# Patient Record
Sex: FEMALE | Race: WHITE | HISPANIC OR LATINO | Employment: FULL TIME
[De-identification: names, ages, dates, MRNs, and addresses within clinical notes are randomized per-mention and may not be internally consistent; named-entity substitution may affect disease eponyms.]

---

## 2017-01-01 ENCOUNTER — HOSPITAL (OUTPATIENT)
Dept: OTHER | Age: 32
End: 2017-01-01
Attending: ORTHOPAEDIC SURGERY

## 2017-01-13 ENCOUNTER — CHARTING TRANS (OUTPATIENT)
Dept: OTHER | Age: 32
End: 2017-01-13

## 2017-01-13 ENCOUNTER — LAB SERVICES (OUTPATIENT)
Dept: OTHER | Age: 32
End: 2017-01-13

## 2017-01-13 ASSESSMENT — PAIN SCALES - GENERAL: PAINLEVEL_OUTOF10: 0

## 2017-01-14 ENCOUNTER — CHARTING TRANS (OUTPATIENT)
Dept: OTHER | Age: 32
End: 2017-01-14

## 2017-01-14 LAB
ALBUMIN SERPL-MCNC: 3.7 G/DL (ref 3.6–5.1)
ALBUMIN/GLOB SERPL: 0.9 (ref 1–2.4)
ALP SERPL-CCNC: 108 UNITS/L (ref 45–117)
ALT SERPL-CCNC: 33 UNITS/L
ANION GAP SERPL CALC-SCNC: 12 MMOL/L (ref 10–20)
AST SERPL-CCNC: 22 UNITS/L
BASOPHILS # BLD: 0 K/MCL (ref 0–0.3)
BASOPHILS NFR BLD: 0 %
BILIRUB SERPL-MCNC: 0.3 MG/DL (ref 0.2–1)
BUN SERPL-MCNC: 10 MG/DL (ref 10–20)
BUN/CREAT SERPL: 18 (ref 7–25)
CALCIUM SERPL-MCNC: 8.7 MG/DL (ref 8.4–10.2)
CHLORIDE SERPL-SCNC: 105 MMOL/L (ref 98–107)
CO2 SERPL-SCNC: 27 MMOL/L (ref 21–32)
CREAT SERPL-MCNC: 0.57 MG/DL (ref 0.51–0.95)
DIFFERENTIAL METHOD BLD: NORMAL
EOSINOPHIL # BLD: 0.1 K/MCL (ref 0.1–0.5)
EOSINOPHIL NFR BLD: 1 %
ERYTHROCYTE [DISTWIDTH] IN BLOOD: 12.7 % (ref 11–15)
GLOBULIN SER-MCNC: 4.2 G/DL (ref 2–4)
GLUCOSE SERPL-MCNC: 95 MG/DL (ref 65–99)
HEMATOCRIT: 40.1 % (ref 36–46.5)
HEMOGLOBIN: 13.6 G/DL (ref 12–15.5)
LENGTH OF FAST TIME PATIENT: ABNORMAL HRS
LYMPHOCYTES # BLD: 3 K/MCL (ref 1–4.8)
LYMPHOCYTES NFR BLD: 30 %
MAGNESIUM SERPL-MCNC: 1.9 MG/DL (ref 1.7–2.4)
MEAN CORPUSCULAR HEMOGLOBIN: 29.4 PG (ref 26–34)
MEAN CORPUSCULAR HGB CONC: 33.9 G/DL (ref 32–36.5)
MEAN CORPUSCULAR VOLUME: 86.6 FL (ref 78–100)
MONOCYTES # BLD: 0.7 K/MCL (ref 0.3–0.9)
MONOCYTES NFR BLD: 7 %
NEUTROPHILS # BLD: 6.5 K/MCL (ref 1.8–7.7)
NEUTROPHILS NFR BLD: 62 %
PLATELET COUNT: 308 K/MCL (ref 140–450)
POTASSIUM SERPL-SCNC: 3.7 MMOL/L (ref 3.4–5.1)
RED CELL COUNT: 4.63 MIL/MCL (ref 4–5.2)
SODIUM SERPL-SCNC: 140 MMOL/L (ref 135–145)
TOTAL PROTEIN: 7.9 G/DL (ref 6.4–8.2)
TSH SERPL-ACNC: 1.7 MCUNITS/ML (ref 0.35–5)
WHITE BLOOD COUNT: 10.3 K/MCL (ref 4.2–11)

## 2017-02-01 ENCOUNTER — HOSPITAL (OUTPATIENT)
Dept: OTHER | Age: 32
End: 2017-02-01
Attending: ORTHOPAEDIC SURGERY

## 2017-04-27 ENCOUNTER — CHARTING TRANS (OUTPATIENT)
Dept: OTHER | Age: 32
End: 2017-04-27

## 2017-04-27 ENCOUNTER — LAB SERVICES (OUTPATIENT)
Dept: OTHER | Age: 32
End: 2017-04-27

## 2017-04-27 LAB
APPEARANCE: CLEAR
BILIRUBIN: NEGATIVE
COLOR: YELLOW
GLUCOSE U: NEGATIVE
KETONES: NEGATIVE
LEUKOCYTES: NEGATIVE
NITRITE: NEGATIVE
OCCULT BLOOD: NORMAL
PH: 5.5
PROTEIN: NEGATIVE
RAPID STREP GROUP A: POSITIVE
URINE SPEC GRAVITY: 1.01
UROBILINOGEN: NORMAL

## 2017-04-27 ASSESSMENT — PAIN SCALES - GENERAL: PAINLEVEL_OUTOF10: 5

## 2017-05-01 ENCOUNTER — CHARTING TRANS (OUTPATIENT)
Dept: OTHER | Age: 32
End: 2017-05-01

## 2017-05-01 ENCOUNTER — LAB SERVICES (OUTPATIENT)
Dept: OTHER | Age: 32
End: 2017-05-01

## 2017-05-01 LAB
APPEARANCE: CLEAR
BACTERIA UR CULT: NORMAL
BILIRUBIN: NORMAL
COLOR: YELLOW
GLUCOSE U: NORMAL
KETONES: NORMAL
LEUKOCYTE ESTERASE: NORMAL
LEUKOCYTES: NORMAL
NITRITE: NORMAL
OCCULT BLOOD: NORMAL
PH: NORMAL
PROTEIN: NORMAL
URINE SPEC GRAVITY: 1.02
UROBILINOGEN: NORMAL

## 2017-05-19 ENCOUNTER — IMAGING SERVICES (OUTPATIENT)
Dept: OTHER | Age: 32
End: 2017-05-19

## 2017-05-19 ENCOUNTER — HOSPITAL (OUTPATIENT)
Dept: OTHER | Age: 32
End: 2017-05-19

## 2017-08-09 ENCOUNTER — HOSPITAL (OUTPATIENT)
Dept: OTHER | Age: 32
End: 2017-08-09

## 2017-08-09 ENCOUNTER — LAB SERVICES (OUTPATIENT)
Dept: OTHER | Age: 32
End: 2017-08-09

## 2017-08-09 LAB
ALBUMIN SERPL-MCNC: 3.5 G/DL (ref 3.6–5.1)
ALBUMIN SERPL-MCNC: 3.5 GM/DL (ref 3.6–5.1)
ALBUMIN/GLOB SERPL: 0.7 (ref 1–2.4)
ALBUMIN/GLOB SERPL: 0.7 {RATIO} (ref 1–2.4)
ALP SERPL-CCNC: 98 UNIT/L (ref 45–117)
ALP SERPL-CCNC: 98 UNITS/L (ref 45–117)
ALT SERPL-CCNC: 31 UNIT/L
ALT SERPL-CCNC: 31 UNITS/L
ANALYZER ANC (IANC): ABNORMAL
ANALYZER ANC (IANC): ABNORMAL
ANION GAP SERPL CALC-SCNC: 16 MMOL/L (ref 10–20)
ANION GAP SERPL CALC-SCNC: 16 MMOL/L (ref 10–20)
AST SERPL-CCNC: 30 UNIT/L
AST SERPL-CCNC: 30 UNITS/L
BASOPHILS # BLD: 0 K/MCL (ref 0–0.3)
BASOPHILS # BLD: 0 THOUSAND/MCL (ref 0–0.3)
BASOPHILS NFR BLD: 0 %
BASOPHILS NFR BLD: 0 %
BILIRUB SERPL-MCNC: 0.4 MG/DL (ref 0.2–1)
BILIRUB SERPL-MCNC: 0.4 MG/DL (ref 0.2–1)
BUN SERPL-MCNC: 11 MG/DL (ref 6–20)
BUN SERPL-MCNC: 11 MG/DL (ref 6–20)
BUN/CREAT SERPL: 16 (ref 7–25)
BUN/CREAT SERPL: 16 (ref 7–25)
CALCIUM SERPL-MCNC: 8.8 MG/DL (ref 8.4–10.2)
CALCIUM SERPL-MCNC: 8.8 MG/DL (ref 8.4–10.2)
CHLORIDE SERPL-SCNC: 100 MMOL/L (ref 98–107)
CHLORIDE: 100 MMOL/L (ref 98–107)
CO2 SERPL-SCNC: 23 MMOL/L (ref 21–32)
CO2 SERPL-SCNC: 23 MMOL/L (ref 21–32)
CREAT SERPL-MCNC: 0.69 MG/DL (ref 0.51–0.95)
CREAT SERPL-MCNC: 0.69 MG/DL (ref 0.51–0.95)
DIFFERENTIAL METHOD BLD: ABNORMAL
DIFFERENTIAL METHOD BLD: ABNORMAL
EOSINOPHIL # BLD: 0.1 K/MCL (ref 0.1–0.5)
EOSINOPHIL # BLD: 0.1 THOUSAND/MCL (ref 0.1–0.5)
EOSINOPHIL NFR BLD: 1 %
EOSINOPHIL NFR BLD: 1 %
ERYTHROCYTE [DISTWIDTH] IN BLOOD: 12.6 % (ref 11–15)
ERYTHROCYTE [DISTWIDTH] IN BLOOD: 12.6 % (ref 11–15)
GLOBULIN SER-MCNC: 5.3 G/DL (ref 2–4)
GLOBULIN SER-MCNC: 5.3 GM/DL (ref 2–4)
GLUCOSE SERPL-MCNC: 119 MG/DL (ref 65–99)
GLUCOSE SERPL-MCNC: 119 MG/DL (ref 65–99)
HEMATOCRIT: 45.7 % (ref 36–46.5)
HEMATOCRIT: 45.7 % (ref 36–46.5)
HEMOGLOBIN: 16.2 G/DL (ref 12–15.5)
HGB BLD-MCNC: 16.2 GM/DL (ref 12–15.5)
LIPASE SERPL-CCNC: 117 UNIT/L (ref 73–393)
LIPASE SERPL-CCNC: 117 UNITS/L (ref 73–393)
LYMPHOCYTES # BLD: 2.5 K/MCL (ref 1–4.8)
LYMPHOCYTES # BLD: 2.5 THOUSAND/MCL (ref 1–4.8)
LYMPHOCYTES NFR BLD: 26 %
LYMPHOCYTES NFR BLD: 26 %
MAGNESIUM SERPL-MCNC: 1.7 MG/DL (ref 1.7–2.4)
MCH RBC QN AUTO: 29.5 PG (ref 26–34)
MCHC RBC AUTO-ENTMCNC: 35.4 GM/DL (ref 32–36.5)
MCV RBC AUTO: 83.1 FL (ref 78–100)
MEAN CORPUSCULAR HEMOGLOBIN: 29.5 PG (ref 26–34)
MEAN CORPUSCULAR HGB CONC: 35.4 G/DL (ref 32–36.5)
MEAN CORPUSCULAR VOLUME: 83.1 FL (ref 78–100)
MONOCYTES # BLD: 0.9 K/MCL (ref 0.3–0.9)
MONOCYTES # BLD: 0.9 THOUSAND/MCL (ref 0.3–0.9)
MONOCYTES NFR BLD: 10 %
MONOCYTES NFR BLD: 10 %
NEUTROPHILS # BLD: 6 K/MCL (ref 1.8–7.7)
NEUTROPHILS # BLD: 6 THOUSAND/MCL (ref 1.8–7.7)
NEUTROPHILS NFR BLD: 63 %
NEUTROPHILS NFR BLD: 63 %
NEUTS SEG NFR BLD: ABNORMAL
NEUTS SEG NFR BLD: ABNORMAL %
NRBC (NRBCRE): ABNORMAL
PERCENT NRBC: ABNORMAL
PLATELET # BLD: 272 THOUSAND/MCL (ref 140–450)
PLATELET COUNT: 272 K/MCL (ref 140–450)
POTASSIUM SERPL-SCNC: 3.1 MMOL/L (ref 3.4–5.1)
POTASSIUM SERPL-SCNC: 3.1 MMOL/L (ref 3.4–5.1)
PROT SERPL-MCNC: 8.8 GM/DL (ref 6.4–8.2)
RBC # BLD: 5.5 MILLION/MCL (ref 4–5.2)
RED CELL COUNT: 5.5 MIL/MCL (ref 4–5.2)
SODIUM SERPL-SCNC: 136 MMOL/L (ref 135–145)
SODIUM SERPL-SCNC: 136 MMOL/L (ref 135–145)
TOTAL PROTEIN: 8.8 G/DL (ref 6.4–8.2)
WBC # BLD: 9.5 THOUSAND/MCL (ref 4.2–11)
WHITE BLOOD COUNT: 9.5 K/MCL (ref 4.2–11)

## 2017-08-10 ENCOUNTER — LAB SERVICES (OUTPATIENT)
Dept: OTHER | Age: 32
End: 2017-08-10

## 2017-08-10 LAB
AMORPH SED URNS QL MICRO: ABNORMAL
AMORPH SED URNS QL MICRO: ABNORMAL
APPEARANCE UR: ABNORMAL
APPEARANCE UR: ABNORMAL
BACTERIA #/AREA URNS HPF: ABNORMAL /HPF
BACTERIA #/AREA URNS HPF: ABNORMAL /HPF
BILIRUB UR QL: NEGATIVE
BILIRUB UR QL: NEGATIVE
CAOX CRY URNS QL MICRO: ABNORMAL
CAOX CRY URNS QL MICRO: ABNORMAL
COLOR UR: ABNORMAL
COLOR UR: ABNORMAL
EPITH CASTS #/AREA URNS LPF: ABNORMAL
EPITH CASTS #/AREA URNS LPF: ABNORMAL /[LPF]
FATTY CASTS #/AREA URNS LPF: ABNORMAL
FATTY CASTS #/AREA URNS LPF: ABNORMAL /[LPF]
GLUCOSE UR-MCNC: NEGATIVE MG/DL
GLUCOSE UR-MCNC: NEGATIVE MG/DL
GRAN CASTS #/AREA URNS LPF: ABNORMAL
GRAN CASTS #/AREA URNS LPF: ABNORMAL /[LPF]
HCG POINT OF CARE (5HGRST): NEGATIVE
HCG POINT OF CARE (5HGRST): NEGATIVE
HG POINT OF CARE QC: NORMAL
HG POINT OF CARE QC: NORMAL
HGB UR QL: NEGATIVE
HYALINE CASTS #/AREA URNS LPF: ABNORMAL
HYALINE CASTS #/AREA URNS LPF: ABNORMAL /[LPF]
KETONES UR-MCNC: 20 MG/DL
KETONES UR-MCNC: 20 MG/DL
LEUKOCYTE ESTERASE UR QL STRIP: NEGATIVE
MAGNESIUM SERPL-MCNC: 1.7 MG/DL (ref 1.7–2.4)
MICROSCOPIC (MT): ABNORMAL
MICROSCOPIC (MT): ABNORMAL
MIXED CELL CASTS #/AREA URNS LPF: ABNORMAL
MIXED CELL CASTS #/AREA URNS LPF: ABNORMAL /[LPF]
MUCOUS THREADS URNS QL MICRO: PRESENT
MUCOUS THREADS URNS QL MICRO: PRESENT
NITRITE UR QL: NEGATIVE
NITRITE UR QL: NEGATIVE
PH UR: 5 UNIT (ref 5–7)
PH UR: 5 UNITS (ref 5–7)
PROT UR QL: 100 MG/DL
PROT UR QL: 100 MG/DL
RBC #/AREA URNS HPF: ABNORMAL /HPF (ref 0–3)
RBC #/AREA URNS HPF: ABNORMAL /HPF (ref 0–3)
RBC CASTS #/AREA URNS LPF: ABNORMAL
RBC CASTS #/AREA URNS LPF: ABNORMAL /[LPF]
RBC-URINE: NEGATIVE
RENAL EPI CELLS #/AREA URNS HPF: ABNORMAL
RENAL EPI CELLS #/AREA URNS HPF: ABNORMAL /[HPF]
SP GR UR: 1.03 (ref 1–1.03)
SP GR UR: 1.03 (ref 1–1.03)
SPECIMEN SOURCE: ABNORMAL
SPECIMEN SOURCE: ABNORMAL
SPERM URNS QL MICRO: ABNORMAL
SPERM URNS QL MICRO: ABNORMAL
SQUAMOUS #/AREA URNS HPF: ABNORMAL /HPF (ref 0–5)
SQUAMOUS #/AREA URNS HPF: ABNORMAL /HPF (ref 0–5)
T VAGINALIS URNS QL MICRO: ABNORMAL
T VAGINALIS URNS QL MICRO: ABNORMAL
TRI-PHOS CRY URNS QL MICRO: ABNORMAL
TRI-PHOS CRY URNS QL MICRO: ABNORMAL
URATE CRY URNS QL MICRO: ABNORMAL
URATE CRY URNS QL MICRO: ABNORMAL
URNS CMNT MICRO: ABNORMAL
URNS CMNT MICRO: ABNORMAL
UROBILINOGEN UR QL: 0.2 MG/DL (ref 0–1)
UROBILINOGEN UR QL: 0.2 MG/DL (ref 0–1)
WAXY CASTS #/AREA URNS LPF: ABNORMAL
WAXY CASTS #/AREA URNS LPF: ABNORMAL /[LPF]
WBC #/AREA URNS HPF: ABNORMAL /HPF (ref 0–5)
WBC #/AREA URNS HPF: ABNORMAL /HPF (ref 0–5)
WBC CASTS #/AREA URNS LPF: ABNORMAL
WBC CASTS #/AREA URNS LPF: ABNORMAL /[LPF]
WBC-URINE: NEGATIVE
YEAST HYPHAE URNS QL MICRO: ABNORMAL
YEAST HYPHAE URNS QL MICRO: ABNORMAL
YEAST URNS QL MICRO: ABNORMAL
YEAST URNS QL MICRO: ABNORMAL

## 2017-08-13 ENCOUNTER — LAB SERVICES (OUTPATIENT)
Dept: OTHER | Age: 32
End: 2017-08-13

## 2017-08-13 ENCOUNTER — HOSPITAL (OUTPATIENT)
Dept: OTHER | Age: 32
End: 2017-08-13

## 2017-08-13 LAB
ALBUMIN SERPL-MCNC: 3.3 G/DL (ref 3.6–5.1)
ALBUMIN SERPL-MCNC: 3.3 GM/DL (ref 3.6–5.1)
ALBUMIN/GLOB SERPL: 0.7 (ref 1–2.4)
ALBUMIN/GLOB SERPL: 0.7 {RATIO} (ref 1–2.4)
ALP SERPL-CCNC: 88 UNIT/L (ref 45–117)
ALP SERPL-CCNC: 88 UNITS/L (ref 45–117)
ALT SERPL-CCNC: 23 UNIT/L
ALT SERPL-CCNC: 23 UNITS/L
ANALYZER ANC (IANC): ABNORMAL
ANALYZER ANC (IANC): ABNORMAL
ANION GAP SERPL CALC-SCNC: 17 MMOL/L (ref 10–20)
ANION GAP SERPL CALC-SCNC: 17 MMOL/L (ref 10–20)
AST SERPL-CCNC: 26 UNIT/L
AST SERPL-CCNC: 26 UNITS/L
BASOPHILS # BLD: 0.1 K/MCL (ref 0–0.3)
BASOPHILS # BLD: 0.1 THOUSAND/MCL (ref 0–0.3)
BASOPHILS NFR BLD: 1 %
BASOPHILS NFR BLD: 1 %
BILIRUB SERPL-MCNC: 0.3 MG/DL (ref 0.2–1)
BILIRUB SERPL-MCNC: 0.3 MG/DL (ref 0.2–1)
BUN SERPL-MCNC: 13 MG/DL (ref 6–20)
BUN SERPL-MCNC: 13 MG/DL (ref 6–20)
BUN/CREAT SERPL: 22 (ref 7–25)
BUN/CREAT SERPL: 22 (ref 7–25)
CALCIUM SERPL-MCNC: 8.6 MG/DL (ref 8.4–10.2)
CALCIUM SERPL-MCNC: 8.6 MG/DL (ref 8.4–10.2)
CHLORIDE SERPL-SCNC: 101 MMOL/L (ref 98–107)
CHLORIDE: 101 MMOL/L (ref 98–107)
CO2 SERPL-SCNC: 25 MMOL/L (ref 21–32)
CO2 SERPL-SCNC: 25 MMOL/L (ref 21–32)
CREAT SERPL-MCNC: 0.58 MG/DL (ref 0.51–0.95)
CREAT SERPL-MCNC: 0.58 MG/DL (ref 0.51–0.95)
DIFFERENTIAL METHOD BLD: ABNORMAL
DIFFERENTIAL METHOD BLD: ABNORMAL
EOSINOPHIL # BLD: 0.2 K/MCL (ref 0.1–0.5)
EOSINOPHIL # BLD: 0.2 THOUSAND/MCL (ref 0.1–0.5)
EOSINOPHIL NFR BLD: 2 %
EOSINOPHIL NFR BLD: 2 %
ERYTHROCYTE [DISTWIDTH] IN BLOOD: 12.3 % (ref 11–15)
ERYTHROCYTE [DISTWIDTH] IN BLOOD: 12.3 % (ref 11–15)
GLOBULIN SER-MCNC: 5 G/DL (ref 2–4)
GLOBULIN SER-MCNC: 5 GM/DL (ref 2–4)
GLUCOSE SERPL-MCNC: 97 MG/DL (ref 65–99)
GLUCOSE SERPL-MCNC: 97 MG/DL (ref 65–99)
HEMATOCRIT: 42.8 % (ref 36–46.5)
HEMATOCRIT: 42.8 % (ref 36–46.5)
HEMOGLOBIN: 15.1 G/DL (ref 12–15.5)
HGB BLD-MCNC: 15.1 GM/DL (ref 12–15.5)
LYMPHOCYTES # BLD: 3.4 K/MCL (ref 1–4.8)
LYMPHOCYTES # BLD: 3.4 THOUSAND/MCL (ref 1–4.8)
LYMPHOCYTES NFR BLD: 36 %
LYMPHOCYTES NFR BLD: 36 %
MCH RBC QN AUTO: 28.7 PG (ref 26–34)
MCHC RBC AUTO-ENTMCNC: 35.3 GM/DL (ref 32–36.5)
MCV RBC AUTO: 81.4 FL (ref 78–100)
MEAN CORPUSCULAR HEMOGLOBIN: 28.7 PG (ref 26–34)
MEAN CORPUSCULAR HGB CONC: 35.3 G/DL (ref 32–36.5)
MEAN CORPUSCULAR VOLUME: 81.4 FL (ref 78–100)
MONOCYTES # BLD: 1.2 K/MCL (ref 0.3–0.9)
MONOCYTES # BLD: 1.2 THOUSAND/MCL (ref 0.3–0.9)
MONOCYTES NFR BLD: 13 %
MONOCYTES NFR BLD: 13 %
NEUTROPHILS # BLD: 4.5 K/MCL (ref 1.8–7.7)
NEUTROPHILS # BLD: 4.5 THOUSAND/MCL (ref 1.8–7.7)
NEUTROPHILS NFR BLD: 48 %
NEUTROPHILS NFR BLD: 48 %
NEUTS SEG NFR BLD: ABNORMAL
NEUTS SEG NFR BLD: ABNORMAL %
NRBC (NRBCRE): ABNORMAL
PERCENT NRBC: ABNORMAL
PLATELET # BLD: 285 THOUSAND/MCL (ref 140–450)
PLATELET COUNT: 285 K/MCL (ref 140–450)
POTASSIUM SERPL-SCNC: 3 MMOL/L (ref 3.4–5.1)
POTASSIUM SERPL-SCNC: 3 MMOL/L (ref 3.4–5.1)
PROT SERPL-MCNC: 8.3 GM/DL (ref 6.4–8.2)
RBC # BLD: 5.26 MILLION/MCL (ref 4–5.2)
RED CELL COUNT: 5.26 MIL/MCL (ref 4–5.2)
SODIUM SERPL-SCNC: 140 MMOL/L (ref 135–145)
SODIUM SERPL-SCNC: 140 MMOL/L (ref 135–145)
TOTAL PROTEIN: 8.3 G/DL (ref 6.4–8.2)
WBC # BLD: 9.2 THOUSAND/MCL (ref 4.2–11)
WHITE BLOOD COUNT: 9.2 K/MCL (ref 4.2–11)

## 2017-08-14 ENCOUNTER — LAB SERVICES (OUTPATIENT)
Dept: OTHER | Age: 32
End: 2017-08-14

## 2017-08-14 LAB
APPEARANCE UR: ABNORMAL
APPEARANCE UR: ABNORMAL
BACTERIA #/AREA URNS HPF: ABNORMAL /HPF
BACTERIA #/AREA URNS HPF: ABNORMAL /HPF
BILIRUB UR QL: NEGATIVE
BILIRUB UR QL: NEGATIVE
COLOR UR: YELLOW
COLOR UR: YELLOW
GLUCOSE UR-MCNC: NEGATIVE MG/DL
GLUCOSE UR-MCNC: NEGATIVE MG/DL
HCG POINT OF CARE (5HGRST): NEGATIVE
HCG POINT OF CARE (5HGRST): NEGATIVE
HG POINT OF CARE QC: NORMAL
HG POINT OF CARE QC: NORMAL
HGB UR QL: NEGATIVE
HYALINE CASTS #/AREA URNS LPF: ABNORMAL /LPF (ref 0–5)
HYALINE CASTS #/AREA URNS LPF: ABNORMAL /LPF (ref 0–5)
KETONES UR-MCNC: >80 MG/DL
KETONES UR-MCNC: >80 MG/DL
LEUKOCYTE ESTERASE UR QL STRIP: NEGATIVE
MUCOUS THREADS URNS QL MICRO: PRESENT
MUCOUS THREADS URNS QL MICRO: PRESENT
NITRITE UR QL: NEGATIVE
NITRITE UR QL: NEGATIVE
PH UR: 6.5 UNIT (ref 5–7)
PH UR: 6.5 UNITS (ref 5–7)
PROT UR QL: 100 MG/DL
PROT UR QL: 100 MG/DL
RBC #/AREA URNS HPF: ABNORMAL /HPF (ref 0–3)
RBC #/AREA URNS HPF: ABNORMAL /HPF (ref 0–3)
RBC-URINE: NEGATIVE
SP GR UR: 1.02 (ref 1–1.03)
SP GR UR: 1.02 (ref 1–1.03)
SPECIMEN SOURCE: ABNORMAL
SPECIMEN SOURCE: ABNORMAL
SQUAMOUS #/AREA URNS HPF: ABNORMAL /HPF (ref 0–5)
SQUAMOUS #/AREA URNS HPF: ABNORMAL /HPF (ref 0–5)
UROBILINOGEN UR QL: 0.2 MG/DL (ref 0–1)
UROBILINOGEN UR QL: 0.2 MG/DL (ref 0–1)
WBC #/AREA URNS HPF: ABNORMAL /HPF (ref 0–5)
WBC #/AREA URNS HPF: ABNORMAL /HPF (ref 0–5)
WBC-URINE: NEGATIVE

## 2017-08-17 ENCOUNTER — CHARTING TRANS (OUTPATIENT)
Dept: OTHER | Age: 32
End: 2017-08-17

## 2018-11-03 VITALS
OXYGEN SATURATION: 98 % | TEMPERATURE: 96.6 F | WEIGHT: 167.55 LBS | HEART RATE: 91 BPM | SYSTOLIC BLOOD PRESSURE: 86 MMHG | DIASTOLIC BLOOD PRESSURE: 50 MMHG | HEIGHT: 62 IN | BODY MASS INDEX: 30.83 KG/M2 | RESPIRATION RATE: 18 BRPM

## 2018-11-03 VITALS
RESPIRATION RATE: 20 BRPM | DIASTOLIC BLOOD PRESSURE: 78 MMHG | HEART RATE: 119 BPM | TEMPERATURE: 100.5 F | SYSTOLIC BLOOD PRESSURE: 119 MMHG | OXYGEN SATURATION: 99 %

## 2018-11-03 VITALS
HEIGHT: 62 IN | DIASTOLIC BLOOD PRESSURE: 72 MMHG | OXYGEN SATURATION: 100 % | HEART RATE: 86 BPM | RESPIRATION RATE: 18 BRPM | BODY MASS INDEX: 32.2 KG/M2 | TEMPERATURE: 97.9 F | SYSTOLIC BLOOD PRESSURE: 110 MMHG | WEIGHT: 175 LBS

## 2018-11-05 VITALS
DIASTOLIC BLOOD PRESSURE: 60 MMHG | TEMPERATURE: 97.3 F | WEIGHT: 172.5 LBS | HEART RATE: 73 BPM | RESPIRATION RATE: 16 BRPM | HEIGHT: 62 IN | SYSTOLIC BLOOD PRESSURE: 102 MMHG | OXYGEN SATURATION: 99 % | BODY MASS INDEX: 31.74 KG/M2

## 2018-11-15 ENCOUNTER — LAB SERVICES (OUTPATIENT)
Dept: OTHER | Age: 33
End: 2018-11-15

## 2018-11-15 ENCOUNTER — CHARTING TRANS (OUTPATIENT)
Dept: OTHER | Age: 33
End: 2018-11-15

## 2018-11-15 LAB
ALBUMIN SERPL-MCNC: 3.6 G/DL (ref 3.6–5.1)
ALBUMIN/GLOB SERPL: 0.9 {RATIO} (ref 1–2.4)
ALP SERPL-CCNC: 99 UNITS/L (ref 45–117)
ALT SERPL-CCNC: 21 UNITS/L
ANION GAP SERPL CALC-SCNC: 12 MMOL/L (ref 10–20)
AST SERPL-CCNC: 20 UNITS/L
BASOPHILS # BLD: 0 K/MCL (ref 0–0.3)
BASOPHILS NFR BLD: 0 %
BILIRUB SERPL-MCNC: 0.5 MG/DL (ref 0.2–1)
BUN SERPL-MCNC: 10 MG/DL (ref 6–20)
BUN/CREAT SERPL: 17 (ref 7–25)
CALCIUM SERPL-MCNC: 8.5 MG/DL (ref 8.4–10.2)
CHLORIDE SERPL-SCNC: 108 MMOL/L (ref 98–107)
CHOLEST SERPL-MCNC: 187 MG/DL
CHOLEST/HDLC SERPL: 3.8 {RATIO}
CO2 SERPL-SCNC: 25 MMOL/L (ref 21–32)
CREAT SERPL-MCNC: 0.59 MG/DL (ref 0.51–0.95)
DIFFERENTIAL METHOD BLD: NORMAL
EOSINOPHIL # BLD: 0.1 K/MCL (ref 0.1–0.5)
EOSINOPHIL NFR BLD: 1 %
ERYTHROCYTE [DISTWIDTH] IN BLOOD: 12.5 % (ref 11–15)
GLOBULIN SER-MCNC: 4.1 G/DL (ref 2–4)
GLUCOSE SERPL-MCNC: 92 MG/DL (ref 65–99)
HBA1C MFR BLD: 5.3 % (ref 4.5–5.6)
HCT VFR BLD CALC: 39.8 % (ref 36–46.5)
HDLC SERPL-MCNC: 49 MG/DL
HGB BLD-MCNC: 13.7 G/DL (ref 12–15.5)
IMM GRANULOCYTES # BLD AUTO: 0 K/MCL (ref 0–0.2)
IMM GRANULOCYTES NFR BLD: 0 %
LDLC SERPL CALC-MCNC: 110 MG/DL
LENGTH OF FAST TIME PATIENT: 12 HRS
LENGTH OF FAST TIME PATIENT: 12 HRS
LYMPHOCYTES # BLD: 2.9 K/MCL (ref 1–4.8)
LYMPHOCYTES NFR BLD: 37 %
MCH RBC QN AUTO: 29.8 PG (ref 26–34)
MCHC RBC AUTO-ENTMCNC: 34.4 G/DL (ref 32–36.5)
MCV RBC AUTO: 86.7 FL (ref 78–100)
MONOCYTES # BLD: 0.5 K/MCL (ref 0.3–0.9)
MONOCYTES NFR BLD: 6 %
NEUTROPHILS # BLD: 4.4 K/MCL (ref 1.8–7.7)
NEUTROPHILS NFR BLD: 56 %
NONHDLC SERPL-MCNC: 138 MG/DL
NRBC (NRBCRE): 0 /100 WBC
PLATELET # BLD: 307 K/MCL (ref 140–450)
POTASSIUM SERPL-SCNC: 3.9 MMOL/L (ref 3.4–5.1)
PROT SERPL-MCNC: 7.7 G/DL (ref 6.4–8.2)
RBC # BLD: 4.59 MIL/MCL (ref 4–5.2)
SODIUM SERPL-SCNC: 141 MMOL/L (ref 135–145)
TRIGL SERPL-MCNC: 141 MG/DL
TSH SERPL-ACNC: 1.31 MCUNITS/ML (ref 0.35–5)
WBC # BLD: 8 K/MCL (ref 4.2–11)

## 2018-11-16 ENCOUNTER — CHARTING TRANS (OUTPATIENT)
Dept: OTHER | Age: 33
End: 2018-11-16

## 2018-12-07 VITALS
HEART RATE: 72 BPM | BODY MASS INDEX: 32.85 KG/M2 | SYSTOLIC BLOOD PRESSURE: 100 MMHG | WEIGHT: 178.5 LBS | OXYGEN SATURATION: 100 % | HEIGHT: 62 IN | RESPIRATION RATE: 18 BRPM | TEMPERATURE: 97.9 F | DIASTOLIC BLOOD PRESSURE: 60 MMHG

## 2018-12-20 ENCOUNTER — TELEPHONE (OUTPATIENT)
Dept: SCHEDULING | Age: 33
End: 2018-12-20

## 2019-12-16 ENCOUNTER — HOSPITAL ENCOUNTER (EMERGENCY)
Facility: HOSPITAL | Age: 34
Discharge: HOME | End: 2019-12-16
Attending: EMERGENCY MEDICINE
Payer: COMMERCIAL

## 2019-12-16 ENCOUNTER — APPOINTMENT (OUTPATIENT)
Dept: CT IMAGING | Facility: HOSPITAL | Age: 34
End: 2019-12-16
Attending: EMERGENCY MEDICINE
Payer: COMMERCIAL

## 2019-12-16 VITALS
SYSTOLIC BLOOD PRESSURE: 104 MMHG | OXYGEN SATURATION: 99 % | TEMPERATURE: 99 F | BODY MASS INDEX: 32.78 KG/M2 | HEART RATE: 87 BPM | RESPIRATION RATE: 18 BRPM | DIASTOLIC BLOOD PRESSURE: 65 MMHG | WEIGHT: 185 LBS | HEIGHT: 63 IN

## 2019-12-16 DIAGNOSIS — K57.92 ACUTE DIVERTICULITIS: Primary | ICD-10-CM

## 2019-12-16 DIAGNOSIS — N83.202 LEFT OVARIAN CYST: ICD-10-CM

## 2019-12-16 PROCEDURE — 85025 COMPLETE CBC W/AUTO DIFF WBC: CPT

## 2019-12-16 PROCEDURE — 99284 EMERGENCY DEPT VISIT MOD MDM: CPT

## 2019-12-16 PROCEDURE — 36415 COLL VENOUS BLD VENIPUNCTURE: CPT

## 2019-12-16 PROCEDURE — 81001 URINALYSIS AUTO W/SCOPE: CPT | Performed by: EMERGENCY MEDICINE

## 2019-12-16 PROCEDURE — 80048 BASIC METABOLIC PNL TOTAL CA: CPT

## 2019-12-16 PROCEDURE — 81025 URINE PREGNANCY TEST: CPT

## 2019-12-16 PROCEDURE — 85025 COMPLETE CBC W/AUTO DIFF WBC: CPT | Performed by: EMERGENCY MEDICINE

## 2019-12-16 PROCEDURE — 81001 URINALYSIS AUTO W/SCOPE: CPT

## 2019-12-16 PROCEDURE — 80048 BASIC METABOLIC PNL TOTAL CA: CPT | Performed by: EMERGENCY MEDICINE

## 2019-12-16 PROCEDURE — 74177 CT ABD & PELVIS W/CONTRAST: CPT | Performed by: EMERGENCY MEDICINE

## 2019-12-16 PROCEDURE — 81025 URINE PREGNANCY TEST: CPT | Performed by: EMERGENCY MEDICINE

## 2019-12-16 RX ORDER — AMOXICILLIN AND CLAVULANATE POTASSIUM 875; 125 MG/1; MG/1
1 TABLET, FILM COATED ORAL 2 TIMES DAILY
Qty: 20 TABLET | Refills: 0 | Status: SHIPPED | OUTPATIENT
Start: 2019-12-16 | End: 2019-12-26

## 2019-12-16 RX ORDER — CEFDINIR 300 MG/1
300 CAPSULE ORAL ONCE
Status: DISCONTINUED | OUTPATIENT
Start: 2019-12-16 | End: 2019-12-16

## 2019-12-16 RX ORDER — IBUPROFEN 600 MG/1
600 TABLET ORAL ONCE
Status: COMPLETED | OUTPATIENT
Start: 2019-12-16 | End: 2019-12-16

## 2019-12-16 RX ORDER — AMOXICILLIN AND CLAVULANATE POTASSIUM 875; 125 MG/1; MG/1
1 TABLET, FILM COATED ORAL ONCE
Status: COMPLETED | OUTPATIENT
Start: 2019-12-16 | End: 2019-12-16

## 2019-12-16 RX ORDER — CEFDINIR 300 MG/1
300 CAPSULE ORAL 2 TIMES DAILY
Status: DISCONTINUED | OUTPATIENT
Start: 2019-12-16 | End: 2019-12-16

## 2019-12-17 NOTE — ED PROVIDER NOTES
Patient Seen in: [facility] AND [facility] Emergency Department    History   No chief complaint on file. Stated Complaint: lLLQ abd pain    HPI    Patient is here with complaint of pain to the left lower quadrant of the abdomen for about 10 days.   She had a f lesions. Cardiovascular:  Normal S1 and S2, no murmur, regular, with good peripheral perfusion. Respiratory:  Lungs clear to auscultation bilaterally with good effort. No wheezes, ronchi, or rales.   Abdomen:  Soft, has some tenderness to the left lower ---------                               -----------         ------                     CBC W/ DIFFERENTIAL[[id number]]          Abnormal            Final result                 Please view results for these tests on the individual orders.    RAINBOW DRAW B

## 2020-01-01 ENCOUNTER — EXTERNAL RECORD (OUTPATIENT)
Dept: HEALTH INFORMATION MANAGEMENT | Facility: OTHER | Age: 35
End: 2020-01-01

## 2020-01-10 ENCOUNTER — TELEPHONE (OUTPATIENT)
Dept: SCHEDULING | Age: 35
End: 2020-01-10

## 2020-01-11 ENCOUNTER — TELEPHONE (OUTPATIENT)
Dept: INTERNAL MEDICINE | Age: 35
End: 2020-01-11

## 2020-01-12 ENCOUNTER — TELEPHONE (OUTPATIENT)
Dept: SCHEDULING | Age: 35
End: 2020-01-12

## 2020-02-03 ENCOUNTER — TELEPHONE (OUTPATIENT)
Dept: SCHEDULING | Age: 35
End: 2020-02-03

## 2020-02-05 ENCOUNTER — TELEPHONE (OUTPATIENT)
Dept: SCHEDULING | Age: 35
End: 2020-02-05

## 2020-02-07 ENCOUNTER — APPOINTMENT (OUTPATIENT)
Dept: INTERNAL MEDICINE | Age: 35
End: 2020-02-07

## 2020-02-10 ENCOUNTER — OFFICE VISIT (OUTPATIENT)
Dept: INTERNAL MEDICINE | Age: 35
End: 2020-02-10

## 2020-02-10 VITALS
DIASTOLIC BLOOD PRESSURE: 68 MMHG | TEMPERATURE: 98.1 F | WEIGHT: 176 LBS | BODY MASS INDEX: 32.39 KG/M2 | RESPIRATION RATE: 18 BRPM | HEIGHT: 62 IN | SYSTOLIC BLOOD PRESSURE: 120 MMHG | OXYGEN SATURATION: 98 % | HEART RATE: 81 BPM

## 2020-02-10 DIAGNOSIS — K57.32 DIVERTICULITIS OF COLON: Primary | ICD-10-CM

## 2020-02-10 DIAGNOSIS — N83.299 COMPLEX OVARIAN CYST: ICD-10-CM

## 2020-02-10 PROCEDURE — 99214 OFFICE O/P EST MOD 30 MIN: CPT | Performed by: INTERNAL MEDICINE

## 2020-02-10 ASSESSMENT — ENCOUNTER SYMPTOMS
SEIZURES: 0
SHORTNESS OF BREATH: 0
FEVER: 0
CONSTIPATION: 0
COUGH: 0
EYE PAIN: 0
ADENOPATHY: 0
SORE THROAT: 0
BLOOD IN STOOL: 0
WOUND: 0
FATIGUE: 0
DIARRHEA: 0
ABDOMINAL PAIN: 0
UNEXPECTED WEIGHT CHANGE: 0

## 2020-02-24 ENCOUNTER — TELEPHONE (OUTPATIENT)
Dept: SCHEDULING | Age: 35
End: 2020-02-24

## 2020-03-09 ENCOUNTER — OFFICE VISIT (OUTPATIENT)
Dept: OBGYN | Age: 35
End: 2020-03-09
Attending: INTERNAL MEDICINE

## 2020-03-09 VITALS
BODY MASS INDEX: 32.57 KG/M2 | HEART RATE: 60 BPM | OXYGEN SATURATION: 100 % | WEIGHT: 177 LBS | DIASTOLIC BLOOD PRESSURE: 70 MMHG | HEIGHT: 62 IN | SYSTOLIC BLOOD PRESSURE: 100 MMHG

## 2020-03-09 DIAGNOSIS — Z01.419 GYNECOLOGIC EXAM NORMAL: Primary | ICD-10-CM

## 2020-03-09 DIAGNOSIS — N76.0 BACTERIAL VAGINOSIS: ICD-10-CM

## 2020-03-09 DIAGNOSIS — N83.202 LEFT OVARIAN CYST: ICD-10-CM

## 2020-03-09 DIAGNOSIS — B96.89 BACTERIAL VAGINOSIS: ICD-10-CM

## 2020-03-09 PROCEDURE — 99385 PREV VISIT NEW AGE 18-39: CPT | Performed by: OBSTETRICS & GYNECOLOGY

## 2020-03-09 RX ORDER — METRONIDAZOLE 7.5 MG/G
GEL VAGINAL
Qty: 70 G | Refills: 0 | Status: SHIPPED | OUTPATIENT
Start: 2020-03-09 | End: 2021-01-16 | Stop reason: ALTCHOICE

## 2020-03-09 SDOH — HEALTH STABILITY: MENTAL HEALTH: HOW OFTEN DO YOU HAVE A DRINK CONTAINING ALCOHOL?: NEVER

## 2020-03-09 ASSESSMENT — PATIENT HEALTH QUESTIONNAIRE - PHQ9
2. FEELING DOWN, DEPRESSED OR HOPELESS: NOT AT ALL
SUM OF ALL RESPONSES TO PHQ9 QUESTIONS 1 AND 2: 0
1. LITTLE INTEREST OR PLEASURE IN DOING THINGS: NOT AT ALL
SUM OF ALL RESPONSES TO PHQ9 QUESTIONS 1 AND 2: 0

## 2020-03-09 ASSESSMENT — ENCOUNTER SYMPTOMS: GASTROINTESTINAL NEGATIVE: 1

## 2020-03-12 LAB — HPV16+18+45 E6+E7MRNA CVX NAA+PROBE: NORMAL

## 2020-03-13 ENCOUNTER — E-ADVICE (OUTPATIENT)
Dept: INTERNAL MEDICINE | Age: 35
End: 2020-03-13

## 2020-03-23 ENCOUNTER — E-ADVICE (OUTPATIENT)
Dept: OBGYN | Age: 35
End: 2020-03-23

## 2020-03-30 ENCOUNTER — APPOINTMENT (OUTPATIENT)
Dept: OBGYN | Age: 35
End: 2020-03-30
Attending: INTERNAL MEDICINE

## 2020-04-03 ENCOUNTER — HOSPITAL (OUTPATIENT)
Dept: OTHER | Age: 35
End: 2020-04-03
Attending: INTERNAL MEDICINE

## 2020-04-14 ENCOUNTER — APPOINTMENT (OUTPATIENT)
Dept: MATERNAL FETAL MEDICINE | Age: 35
End: 2020-04-14

## 2020-05-18 ENCOUNTER — HOSPITAL (OUTPATIENT)
Dept: OTHER | Age: 35
End: 2020-05-18
Attending: OBSTETRICS & GYNECOLOGY

## 2020-05-18 ENCOUNTER — APPOINTMENT (OUTPATIENT)
Dept: MATERNAL FETAL MEDICINE | Age: 35
End: 2020-05-18

## 2020-05-18 PROCEDURE — 76856 US EXAM PELVIC COMPLETE: CPT | Performed by: OBSTETRICS & GYNECOLOGY

## 2020-05-18 PROCEDURE — 76830 TRANSVAGINAL US NON-OB: CPT | Performed by: OBSTETRICS & GYNECOLOGY

## 2020-05-19 ENCOUNTER — APPOINTMENT (OUTPATIENT)
Dept: MATERNAL FETAL MEDICINE | Age: 35
End: 2020-05-19

## 2020-05-20 ENCOUNTER — E-ADVICE (OUTPATIENT)
Dept: OBGYN | Age: 35
End: 2020-05-20

## 2020-05-27 DIAGNOSIS — Z01.812 ENCOUNTER FOR PREPROCEDURAL LABORATORY EXAMINATION: Primary | ICD-10-CM

## 2020-05-29 ENCOUNTER — LAB SERVICES (OUTPATIENT)
Dept: LAB | Age: 35
End: 2020-05-29

## 2020-05-29 DIAGNOSIS — Z01.812 ENCOUNTER FOR PREPROCEDURAL LABORATORY EXAMINATION: ICD-10-CM

## 2020-05-29 PROCEDURE — 87635 SARS-COV-2 COVID-19 AMP PRB: CPT | Performed by: INTERNAL MEDICINE

## 2020-05-30 LAB
SARS-COV-2 RNA SPEC QL NAA+PROBE: NOT DETECTED
SERVICE CMNT-IMP: NORMAL
SPECIMEN SOURCE: NORMAL

## 2020-06-01 ENCOUNTER — HOSPITAL (OUTPATIENT)
Dept: OTHER | Age: 35
End: 2020-06-01

## 2020-06-02 LAB — PATHOLOGIST NAME: NORMAL

## 2020-08-01 ENCOUNTER — HOSPITAL (OUTPATIENT)
Dept: OTHER | Age: 35
End: 2020-08-01

## 2020-08-01 LAB
ALBUMIN SERPL-MCNC: 3.4 G/DL (ref 3.6–5.1)
ALBUMIN/GLOB SERPL: 0.7 {RATIO} (ref 1–2.4)
ALP SERPL-CCNC: 110 UNITS/L (ref 45–117)
ALT SERPL-CCNC: 25 UNITS/L
AMORPH SED URNS QL MICRO: ABNORMAL
ANALYZER ANC (IANC): ABNORMAL
ANION GAP SERPL CALC-SCNC: 13 MMOL/L (ref 10–20)
APPEARANCE UR: ABNORMAL
APPEARANCE UR: ABNORMAL
AST SERPL-CCNC: 23 UNITS/L
BACTERIA #/AREA URNS HPF: ABNORMAL /HPF
BASOPHILS # BLD: 0 K/MCL (ref 0–0.3)
BASOPHILS NFR BLD: 0 %
BILIRUB SERPL-MCNC: 0.5 MG/DL (ref 0.2–1)
BILIRUB UR QL STRIP: ABNORMAL
BILIRUB UR QL STRIP: NEGATIVE
BUN SERPL-MCNC: 12 MG/DL (ref 6–20)
BUN/CREAT SERPL: 21 (ref 7–25)
CALCIUM SERPL-MCNC: 8.8 MG/DL (ref 8.4–10.2)
CAOX CRY URNS QL MICRO: ABNORMAL
CHLORIDE SERPL-SCNC: 103 MMOL/L (ref 98–107)
CO2 SERPL-SCNC: 25 MMOL/L (ref 21–32)
COLOR UR: YELLOW
COLOR UR: YELLOW
CREAT SERPL-MCNC: 0.56 MG/DL (ref 0.51–0.95)
DIFFERENTIAL METHOD BLD: ABNORMAL
EOSINOPHIL # BLD: 0.1 K/MCL (ref 0.1–0.5)
EOSINOPHIL NFR BLD: 1 %
EPITH CASTS #/AREA URNS LPF: ABNORMAL /[LPF]
ERYTHROCYTE [DISTWIDTH] IN BLOOD: 12.3 % (ref 11–15)
FATTY CASTS #/AREA URNS LPF: ABNORMAL /[LPF]
GLOBULIN SER-MCNC: 5.2 G/DL (ref 2–4)
GLUCOSE SERPL-MCNC: 100 MG/DL (ref 65–99)
GLUCOSE UR STRIP-MCNC: NEGATIVE MG/DL
GLUCOSE UR STRIP-MCNC: NEGATIVE MG/DL
GRAN CASTS #/AREA URNS LPF: ABNORMAL /[LPF]
HCG UR QL: NEGATIVE
HCT VFR BLD CALC: 42.6 % (ref 36–46.5)
HGB BLD-MCNC: 14.5 G/DL (ref 12–15.5)
HGB UR QL STRIP: ABNORMAL
HGB UR QL STRIP: ABNORMAL
HYALINE CASTS #/AREA URNS LPF: ABNORMAL /[LPF]
IMM GRANULOCYTES # BLD AUTO: 0 K/MCL (ref 0–0.2)
IMM GRANULOCYTES NFR BLD: 0 %
KETONES UR STRIP-MCNC: NEGATIVE MG/DL
KETONES UR STRIP-MCNC: NEGATIVE MG/DL
LEUKOCYTE ESTERASE UR QL STRIP: ABNORMAL
LIPASE SERPL-CCNC: 67 UNITS/L (ref 73–393)
LYMPHOCYTES # BLD: 2.2 K/MCL (ref 1–4.8)
LYMPHOCYTES NFR BLD: 19 %
MCH RBC QN AUTO: 29.1 PG (ref 26–34)
MCHC RBC AUTO-ENTMCNC: 34 G/DL (ref 32–36.5)
MCV RBC AUTO: 85.5 FL (ref 78–100)
MICROSCOPIC (MT): ABNORMAL
MIXED CELL CASTS #/AREA URNS LPF: ABNORMAL /[LPF]
MONOCYTES # BLD: 0.8 K/MCL (ref 0.3–0.9)
MONOCYTES NFR BLD: 7 %
MUCOUS THREADS URNS QL MICRO: PRESENT
NEUTROPHILS # BLD: 8.6 K/MCL (ref 1.8–7.7)
NEUTROPHILS NFR BLD: 73 %
NEUTS SEG NFR BLD: ABNORMAL %
NITRITE UR QL STRIP: NEGATIVE
NITRITE UR QL STRIP: NEGATIVE
NRBC BLD MANUAL-RTO: 0 /100 WBC
PH UR STRIP: 5 UNITS (ref 5–7)
PH UR STRIP: 5.5 UNITS (ref 5–7)
PLATELET # BLD: 277 K/MCL (ref 140–450)
POTASSIUM SERPL-SCNC: 4 MMOL/L (ref 3.4–5.1)
PROT SERPL-MCNC: 8.6 G/DL (ref 6.4–8.2)
PROT UR STRIP-MCNC: 30 MG/DL
PROT UR STRIP-MCNC: NEGATIVE MG/DL
RBC # BLD: 4.98 MIL/MCL (ref 4–5.2)
RBC #/AREA URNS HPF: ABNORMAL /HPF (ref 0–2)
RBC CASTS #/AREA URNS LPF: ABNORMAL /[LPF]
RENAL EPI CELLS #/AREA URNS HPF: ABNORMAL /[HPF]
SODIUM SERPL-SCNC: 137 MMOL/L (ref 135–145)
SP GR UR STRIP: 1.02 (ref 1–1.03)
SP GR UR STRIP: >1.03 (ref 1–1.03)
SPECIMEN SOURCE: ABNORMAL
SPERM URNS QL MICRO: ABNORMAL
SQUAMOUS #/AREA URNS HPF: ABNORMAL /HPF (ref 0–5)
T VAGINALIS URNS QL MICRO: ABNORMAL
TRI-PHOS CRY URNS QL MICRO: ABNORMAL
URATE CRY URNS QL MICRO: ABNORMAL
URNS CMNT MICRO: ABNORMAL
UROBILINOGEN UR STRIP-MCNC: 0.2 MG/DL (ref 0–1)
UROBILINOGEN UR STRIP-MCNC: 0.2 MG/DL (ref 0–1)
WAXY CASTS #/AREA URNS LPF: ABNORMAL /[LPF]
WBC # BLD: 11.7 K/MCL (ref 4.2–11)
WBC #/AREA URNS HPF: ABNORMAL /HPF (ref 0–5)
WBC CASTS #/AREA URNS LPF: ABNORMAL /[LPF]
YEAST HYPHAE URNS QL MICRO: ABNORMAL
YEAST URNS QL MICRO: ABNORMAL

## 2020-08-01 PROCEDURE — 99220 INITIAL OBSERVATION CARE,LEVL III: CPT | Performed by: HOSPITALIST

## 2020-08-01 PROCEDURE — 99285 EMERGENCY DEPT VISIT HI MDM: CPT | Performed by: NURSE PRACTITIONER

## 2020-08-02 LAB
ALBUMIN SERPL-MCNC: 2.5 G/DL (ref 3.6–5.1)
ALBUMIN/GLOB SERPL: 0.6 {RATIO} (ref 1–2.4)
ALP SERPL-CCNC: 87 UNITS/L (ref 45–117)
ALT SERPL-CCNC: 22 UNITS/L
ANALYZER ANC (IANC): ABNORMAL
ANION GAP SERPL CALC-SCNC: 10 MMOL/L (ref 10–20)
AST SERPL-CCNC: 19 UNITS/L
BASOPHILS # BLD: 0 K/MCL (ref 0–0.3)
BASOPHILS NFR BLD: 0 %
BILIRUB SERPL-MCNC: 0.6 MG/DL (ref 0.2–1)
BUN SERPL-MCNC: 10 MG/DL (ref 6–20)
BUN/CREAT SERPL: 17 (ref 7–25)
CALCIUM SERPL-MCNC: 7.7 MG/DL (ref 8.4–10.2)
CHLORIDE SERPL-SCNC: 105 MMOL/L (ref 98–107)
CO2 SERPL-SCNC: 26 MMOL/L (ref 21–32)
CREAT SERPL-MCNC: 0.58 MG/DL (ref 0.51–0.95)
DIFFERENTIAL METHOD BLD: ABNORMAL
EOSINOPHIL # BLD: 0.1 K/MCL (ref 0.1–0.5)
EOSINOPHIL NFR BLD: 2 %
ERYTHROCYTE [DISTWIDTH] IN BLOOD: 12.3 % (ref 11–15)
GLOBULIN SER-MCNC: 4.3 G/DL (ref 2–4)
GLUCOSE SERPL-MCNC: 95 MG/DL (ref 65–99)
HCT VFR BLD CALC: 35.8 % (ref 36–46.5)
HGB BLD-MCNC: 12.2 G/DL (ref 12–15.5)
IMM GRANULOCYTES # BLD AUTO: 0.1 K/MCL (ref 0–0.2)
IMM GRANULOCYTES NFR BLD: 1 %
LYMPHOCYTES # BLD: 1.9 K/MCL (ref 1–4.8)
LYMPHOCYTES NFR BLD: 22 %
MCH RBC QN AUTO: 29.3 PG (ref 26–34)
MCHC RBC AUTO-ENTMCNC: 34.1 G/DL (ref 32–36.5)
MCV RBC AUTO: 86.1 FL (ref 78–100)
MONOCYTES # BLD: 0.9 K/MCL (ref 0.3–0.9)
MONOCYTES NFR BLD: 11 %
NEUTROPHILS # BLD: 5.4 K/MCL (ref 1.8–7.7)
NEUTROPHILS NFR BLD: 64 %
NEUTS SEG NFR BLD: ABNORMAL %
NRBC BLD MANUAL-RTO: 0 /100 WBC
PLATELET # BLD: 219 K/MCL (ref 140–450)
POTASSIUM SERPL-SCNC: 4.1 MMOL/L (ref 3.4–5.1)
PROT SERPL-MCNC: 6.8 G/DL (ref 6.4–8.2)
RBC # BLD: 4.16 MIL/MCL (ref 4–5.2)
SARS-COV-2 RNA RESP QL NAA+PROBE: NOT DETECTED
SERVICE CMNT-IMP: NORMAL
SODIUM SERPL-SCNC: 137 MMOL/L (ref 135–145)
SPECIMEN SOURCE: NORMAL
WBC # BLD: 8.5 K/MCL (ref 4.2–11)

## 2020-08-02 PROCEDURE — 99217 OBSERVATION CARE DISCHARGE: CPT | Performed by: INTERNAL MEDICINE

## 2020-08-03 ENCOUNTER — TELEPHONE (OUTPATIENT)
Dept: SCHEDULING | Age: 35
End: 2020-08-03

## 2020-08-03 ENCOUNTER — TELEPHONE (OUTPATIENT)
Dept: CARE COORDINATION | Age: 35
End: 2020-08-03

## 2020-08-03 LAB
BACTERIA UR CULT: NORMAL

## 2020-08-06 ENCOUNTER — V-VISIT (OUTPATIENT)
Dept: INTERNAL MEDICINE | Age: 35
End: 2020-08-06

## 2020-08-06 DIAGNOSIS — K57.32 DIVERTICULITIS OF COLON: Primary | ICD-10-CM

## 2020-08-06 PROCEDURE — 99214 OFFICE O/P EST MOD 30 MIN: CPT | Performed by: INTERNAL MEDICINE

## 2020-08-06 RX ORDER — AMOXICILLIN AND CLAVULANATE POTASSIUM 875; 125 MG/1; MG/1
TABLET, FILM COATED ORAL
COMMUNITY
Start: 2020-08-02 | End: 2021-01-16 | Stop reason: ALTCHOICE

## 2020-08-06 SDOH — HEALTH STABILITY: MENTAL HEALTH: HOW OFTEN DO YOU HAVE A DRINK CONTAINING ALCOHOL?: NEVER

## 2020-08-06 ASSESSMENT — ENCOUNTER SYMPTOMS
SEIZURES: 0
WOUND: 0
EYE PAIN: 0
COUGH: 0
ABDOMINAL PAIN: 0
DIARRHEA: 0
CONSTIPATION: 0
FATIGUE: 0
FEVER: 0
BLOOD IN STOOL: 0
SHORTNESS OF BREATH: 0
ADENOPATHY: 0
SORE THROAT: 0
UNEXPECTED WEIGHT CHANGE: 0

## 2020-08-06 ASSESSMENT — PATIENT HEALTH QUESTIONNAIRE - PHQ9
CLINICAL INTERPRETATION OF PHQ2 SCORE: NO FURTHER SCREENING NEEDED
1. LITTLE INTEREST OR PLEASURE IN DOING THINGS: NOT AT ALL
2. FEELING DOWN, DEPRESSED OR HOPELESS: NOT AT ALL
CLINICAL INTERPRETATION OF PHQ9 SCORE: NO FURTHER SCREENING NEEDED
SUM OF ALL RESPONSES TO PHQ9 QUESTIONS 1 AND 2: 0
SUM OF ALL RESPONSES TO PHQ9 QUESTIONS 1 AND 2: 0

## 2020-10-24 ENCOUNTER — TELEPHONE (OUTPATIENT)
Dept: SCHEDULING | Age: 35
End: 2020-10-24

## 2020-10-24 ENCOUNTER — IMMUNIZATION (OUTPATIENT)
Dept: URGENT CARE | Age: 35
End: 2020-10-24

## 2020-10-24 VITALS — TEMPERATURE: 97 F

## 2020-10-24 DIAGNOSIS — Z23 FLU VACCINE NEED: Primary | ICD-10-CM

## 2020-10-24 PROCEDURE — G0008 ADMIN INFLUENZA VIRUS VAC: HCPCS | Performed by: NURSE PRACTITIONER

## 2020-10-24 PROCEDURE — 90686 IIV4 VACC NO PRSV 0.5 ML IM: CPT | Performed by: NURSE PRACTITIONER

## 2021-01-16 ENCOUNTER — OFFICE VISIT (OUTPATIENT)
Dept: INTERNAL MEDICINE | Age: 36
End: 2021-01-16

## 2021-01-16 DIAGNOSIS — R10.30 LOWER ABDOMINAL PAIN: Primary | ICD-10-CM

## 2021-01-16 PROCEDURE — 99213 OFFICE O/P EST LOW 20 MIN: CPT | Performed by: INTERNAL MEDICINE

## 2021-01-16 SDOH — HEALTH STABILITY: PHYSICAL HEALTH: ON AVERAGE, HOW MANY MINUTES DO YOU ENGAGE IN EXERCISE AT THIS LEVEL?: 0 MIN

## 2021-01-16 SDOH — HEALTH STABILITY: MENTAL HEALTH: HOW OFTEN DO YOU HAVE A DRINK CONTAINING ALCOHOL?: NEVER

## 2021-01-16 SDOH — HEALTH STABILITY: PHYSICAL HEALTH: ON AVERAGE, HOW MANY DAYS PER WEEK DO YOU ENGAGE IN MODERATE TO STRENUOUS EXERCISE (LIKE A BRISK WALK)?: 0 DAYS

## 2021-01-16 SDOH — HEALTH STABILITY: MENTAL HEALTH
STRESS IS WHEN SOMEONE FEELS TENSE, NERVOUS, ANXIOUS, OR CAN'T SLEEP AT NIGHT BECAUSE THEIR MIND IS TROUBLED. HOW STRESSED ARE YOU?: ONLY A LITTLE

## 2021-01-16 ASSESSMENT — ENCOUNTER SYMPTOMS
HEMATOCHEZIA: 0
FATIGUE: 0
DIARRHEA: 0
ANOREXIA: 0
UNEXPECTED WEIGHT CHANGE: 0
WEIGHT LOSS: 0
FLATUS: 0
BELCHING: 0
ABDOMINAL PAIN: 1
BLOOD IN STOOL: 0
SEIZURES: 0
CONSTIPATION: 0
FEVER: 0
COUGH: 0
SHORTNESS OF BREATH: 0
ADENOPATHY: 0
WOUND: 0
HEADACHES: 0
EYE PAIN: 0
SORE THROAT: 0
NAUSEA: 0
VOMITING: 0

## 2021-01-16 ASSESSMENT — PATIENT HEALTH QUESTIONNAIRE - PHQ9
1. LITTLE INTEREST OR PLEASURE IN DOING THINGS: NOT AT ALL
2. FEELING DOWN, DEPRESSED OR HOPELESS: NOT AT ALL
SUM OF ALL RESPONSES TO PHQ9 QUESTIONS 1 AND 2: 0
CLINICAL INTERPRETATION OF PHQ2 SCORE: NO FURTHER SCREENING NEEDED
CLINICAL INTERPRETATION OF PHQ9 SCORE: NO FURTHER SCREENING NEEDED
SUM OF ALL RESPONSES TO PHQ9 QUESTIONS 1 AND 2: 0

## 2021-01-16 ASSESSMENT — PAIN SCALES - GENERAL: PAINLEVEL: 1-2

## 2021-03-09 ENCOUNTER — LAB SERVICES (OUTPATIENT)
Dept: LAB | Age: 36
End: 2021-03-09

## 2021-03-09 ENCOUNTER — OFFICE VISIT (OUTPATIENT)
Dept: INTERNAL MEDICINE | Age: 36
End: 2021-03-09

## 2021-03-09 VITALS
TEMPERATURE: 97.8 F | RESPIRATION RATE: 18 BRPM | BODY MASS INDEX: 34.23 KG/M2 | WEIGHT: 186 LBS | SYSTOLIC BLOOD PRESSURE: 100 MMHG | HEART RATE: 97 BPM | HEIGHT: 62 IN | DIASTOLIC BLOOD PRESSURE: 70 MMHG | OXYGEN SATURATION: 99 %

## 2021-03-09 DIAGNOSIS — R53.83 FATIGUE, UNSPECIFIED TYPE: ICD-10-CM

## 2021-03-09 DIAGNOSIS — L65.9 HAIR LOSS: Primary | ICD-10-CM

## 2021-03-09 DIAGNOSIS — E66.9 OBESITY WITHOUT SERIOUS COMORBIDITY, UNSPECIFIED CLASSIFICATION, UNSPECIFIED OBESITY TYPE: ICD-10-CM

## 2021-03-09 DIAGNOSIS — L65.9 HAIR LOSS: ICD-10-CM

## 2021-03-09 DIAGNOSIS — F41.9 ANXIETY: ICD-10-CM

## 2021-03-09 DIAGNOSIS — H61.21 IMPACTED CERUMEN OF RIGHT EAR: ICD-10-CM

## 2021-03-09 DIAGNOSIS — R63.5 WEIGHT GAIN: ICD-10-CM

## 2021-03-09 LAB
25(OH)D3+25(OH)D2 SERPL-MCNC: 10.7 NG/ML (ref 30–100)
ALBUMIN SERPL-MCNC: 3.6 G/DL (ref 3.6–5.1)
ALBUMIN/GLOB SERPL: 0.9 {RATIO} (ref 1–2.4)
ALP SERPL-CCNC: 109 UNITS/L (ref 45–117)
ALT SERPL-CCNC: 25 UNITS/L
ANION GAP SERPL CALC-SCNC: 8 MMOL/L (ref 10–20)
AST SERPL-CCNC: 16 UNITS/L
BASOPHILS # BLD: 0 K/MCL (ref 0–0.3)
BASOPHILS NFR BLD: 0 %
BILIRUB SERPL-MCNC: 0.4 MG/DL (ref 0.2–1)
BUN SERPL-MCNC: 11 MG/DL (ref 6–20)
BUN/CREAT SERPL: 19 (ref 7–25)
CALCIUM SERPL-MCNC: 9 MG/DL (ref 8.4–10.2)
CHLORIDE SERPL-SCNC: 105 MMOL/L (ref 98–107)
CO2 SERPL-SCNC: 28 MMOL/L (ref 21–32)
CREAT SERPL-MCNC: 0.59 MG/DL (ref 0.51–0.95)
DEPRECATED RDW RBC: 40.5 FL (ref 39–50)
EOSINOPHIL # BLD: 0.2 K/MCL (ref 0–0.5)
EOSINOPHIL NFR BLD: 1 %
ERYTHROCYTE [DISTWIDTH] IN BLOOD: 12.9 % (ref 11–15)
FASTING DURATION TIME PATIENT: 0 HOURS
GFR SERPLBLD BASED ON 1.73 SQ M-ARVRAT: >90 ML/MIN/1.73M2
GLOBULIN SER-MCNC: 4.1 G/DL (ref 2–4)
GLUCOSE SERPL-MCNC: 100 MG/DL (ref 65–99)
HBA1C MFR BLD: 5.2 % (ref 4.5–5.6)
HCT VFR BLD CALC: 42 % (ref 36–46.5)
HGB BLD-MCNC: 14.2 G/DL (ref 12–15.5)
IMM GRANULOCYTES # BLD AUTO: 0.1 K/MCL (ref 0–0.2)
IMM GRANULOCYTES # BLD: 1 %
IRON SATN MFR SERPL: 19 % (ref 15–45)
IRON SERPL-MCNC: 61 MCG/DL (ref 50–170)
LYMPHOCYTES # BLD: 2.9 K/MCL (ref 1–4.8)
LYMPHOCYTES NFR BLD: 27 %
MCH RBC QN AUTO: 29.3 PG (ref 26–34)
MCHC RBC AUTO-ENTMCNC: 33.8 G/DL (ref 32–36.5)
MCV RBC AUTO: 86.6 FL (ref 78–100)
MONOCYTES # BLD: 0.8 K/MCL (ref 0.3–0.9)
MONOCYTES NFR BLD: 8 %
NEUTROPHILS # BLD: 6.9 K/MCL (ref 1.8–7.7)
NEUTROPHILS NFR BLD: 63 %
NRBC BLD MANUAL-RTO: 0 /100 WBC
PLATELET # BLD AUTO: 316 K/MCL (ref 140–450)
POTASSIUM SERPL-SCNC: 4.1 MMOL/L (ref 3.4–5.1)
PROT SERPL-MCNC: 7.7 G/DL (ref 6.4–8.2)
RBC # BLD: 4.85 MIL/MCL (ref 4–5.2)
SODIUM SERPL-SCNC: 137 MMOL/L (ref 135–145)
TIBC SERPL-MCNC: 326 MCG/DL (ref 250–450)
TSH SERPL-ACNC: 2.45 MCUNITS/ML (ref 0.35–5)
WBC # BLD: 10.9 K/MCL (ref 4.2–11)

## 2021-03-09 PROCEDURE — 80053 COMPREHEN METABOLIC PANEL: CPT | Performed by: INTERNAL MEDICINE

## 2021-03-09 PROCEDURE — 84443 ASSAY THYROID STIM HORMONE: CPT | Performed by: INTERNAL MEDICINE

## 2021-03-09 PROCEDURE — 85025 COMPLETE CBC W/AUTO DIFF WBC: CPT | Performed by: INTERNAL MEDICINE

## 2021-03-09 PROCEDURE — 82306 VITAMIN D 25 HYDROXY: CPT | Performed by: INTERNAL MEDICINE

## 2021-03-09 PROCEDURE — 83036 HEMOGLOBIN GLYCOSYLATED A1C: CPT | Performed by: INTERNAL MEDICINE

## 2021-03-09 PROCEDURE — 83540 ASSAY OF IRON: CPT | Performed by: INTERNAL MEDICINE

## 2021-03-09 PROCEDURE — 99214 OFFICE O/P EST MOD 30 MIN: CPT | Performed by: INTERNAL MEDICINE

## 2021-03-09 PROCEDURE — 36415 COLL VENOUS BLD VENIPUNCTURE: CPT | Performed by: INTERNAL MEDICINE

## 2021-03-09 PROCEDURE — 83550 IRON BINDING TEST: CPT | Performed by: INTERNAL MEDICINE

## 2021-03-09 RX ORDER — KETOCONAZOLE 20 MG/ML
SHAMPOO TOPICAL
Qty: 120 ML | Refills: 1 | Status: SHIPPED | OUTPATIENT
Start: 2021-03-09 | End: 2021-05-15

## 2021-03-09 SDOH — HEALTH STABILITY: MENTAL HEALTH: HOW OFTEN DO YOU HAVE A DRINK CONTAINING ALCOHOL?: NEVER

## 2021-03-09 ASSESSMENT — ENCOUNTER SYMPTOMS
ADENOPATHY: 0
SEIZURES: 0
SHORTNESS OF BREATH: 0
EYE PAIN: 0
SORE THROAT: 0
BLOOD IN STOOL: 0
CONSTIPATION: 0
UNEXPECTED WEIGHT CHANGE: 1
WOUND: 0
ABDOMINAL PAIN: 0
COUGH: 0
FEVER: 0
FATIGUE: 1
NERVOUS/ANXIOUS: 1
DIARRHEA: 0

## 2021-03-10 RX ORDER — ERGOCALCIFEROL 1.25 MG/1
50000 CAPSULE ORAL
Qty: 4 CAPSULE | Refills: 4 | Status: SHIPPED | OUTPATIENT
Start: 2021-03-15 | End: 2021-07-19 | Stop reason: ALTCHOICE

## 2021-03-12 ENCOUNTER — E-ADVICE (OUTPATIENT)
Dept: INTERNAL MEDICINE | Age: 36
End: 2021-03-12

## 2021-03-16 ENCOUNTER — OFFICE VISIT (OUTPATIENT)
Dept: OBGYN | Age: 36
End: 2021-03-16

## 2021-03-16 DIAGNOSIS — Z01.419 WELL WOMAN EXAM WITH ROUTINE GYNECOLOGICAL EXAM: Primary | ICD-10-CM

## 2021-03-16 PROCEDURE — 99395 PREV VISIT EST AGE 18-39: CPT | Performed by: OBSTETRICS & GYNECOLOGY

## 2021-03-16 PROCEDURE — 87491 CHLMYD TRACH DNA AMP PROBE: CPT | Performed by: OBSTETRICS & GYNECOLOGY

## 2021-03-16 PROCEDURE — 87591 N.GONORRHOEAE DNA AMP PROB: CPT | Performed by: OBSTETRICS & GYNECOLOGY

## 2021-03-16 PROCEDURE — 88175 CYTOPATH C/V AUTO FLUID REDO: CPT | Performed by: CLINICAL MEDICAL LABORATORY

## 2021-03-16 PROCEDURE — 87624 HPV HI-RISK TYP POOLED RSLT: CPT | Performed by: CLINICAL MEDICAL LABORATORY

## 2021-03-16 SDOH — HEALTH STABILITY: MENTAL HEALTH: HOW OFTEN DO YOU HAVE A DRINK CONTAINING ALCOHOL?: NEVER

## 2021-03-16 SDOH — HEALTH STABILITY: PHYSICAL HEALTH: ON AVERAGE, HOW MANY DAYS PER WEEK DO YOU ENGAGE IN MODERATE TO STRENUOUS EXERCISE (LIKE A BRISK WALK)?: 0 DAYS

## 2021-03-16 SDOH — HEALTH STABILITY: PHYSICAL HEALTH: ON AVERAGE, HOW MANY MINUTES DO YOU ENGAGE IN EXERCISE AT THIS LEVEL?: 0 MIN

## 2021-03-16 ASSESSMENT — PAIN SCALES - GENERAL: PAINLEVEL: 0

## 2021-03-17 LAB
C TRACH RRNA SPEC QL NAA+PROBE: NEGATIVE
Lab: NORMAL
N GONORRHOEA RRNA SPEC QL NAA+PROBE: NEGATIVE

## 2021-03-23 ENCOUNTER — V-VISIT (OUTPATIENT)
Dept: NUTRITION | Age: 36
End: 2021-03-23

## 2021-03-23 DIAGNOSIS — R63.5 WEIGHT GAIN: Primary | ICD-10-CM

## 2021-03-23 PROCEDURE — 97802 MEDICAL NUTRITION INDIV IN: CPT | Performed by: DIETITIAN, REGISTERED

## 2021-03-24 LAB
CASE RPRT: NORMAL
CYTOLOGY CVX/VAG STUDY: NORMAL
HPV16+18+45 E6+E7MRNA CVX NAA+PROBE: NEGATIVE
Lab: NORMAL
PAP EDUCATIONAL NOTE: NORMAL
SPECIMEN ADEQUACY: NORMAL

## 2021-04-17 ENCOUNTER — OFFICE VISIT (OUTPATIENT)
Dept: INTERNAL MEDICINE | Age: 36
End: 2021-04-17

## 2021-04-17 DIAGNOSIS — H61.21 IMPACTED CERUMEN OF RIGHT EAR: Primary | ICD-10-CM

## 2021-04-17 PROCEDURE — 69209 REMOVE IMPACTED EAR WAX UNI: CPT | Performed by: INTERNAL MEDICINE

## 2021-04-17 PROCEDURE — 99214 OFFICE O/P EST MOD 30 MIN: CPT | Performed by: INTERNAL MEDICINE

## 2021-04-17 RX ORDER — NEOMYCIN SULFATE, POLYMYXIN B SULFATE, HYDROCORTISONE 3.5; 10000; 1 MG/ML; [USP'U]/ML; MG/ML
4 SOLUTION/ DROPS AURICULAR (OTIC) 3 TIMES DAILY
Qty: 10 ML | Refills: 0 | Status: SHIPPED | OUTPATIENT
Start: 2021-04-17 | End: 2021-04-24

## 2021-04-17 ASSESSMENT — ENCOUNTER SYMPTOMS
EYE PAIN: 0
FEVER: 0
SHORTNESS OF BREATH: 0
BLOOD IN STOOL: 0
ABDOMINAL PAIN: 0
COUGH: 0
ADENOPATHY: 0
WOUND: 0
CONSTIPATION: 0
SORE THROAT: 0
DIARRHEA: 0
NERVOUS/ANXIOUS: 1
SEIZURES: 0
FATIGUE: 1

## 2021-04-17 ASSESSMENT — PATIENT HEALTH QUESTIONNAIRE - PHQ9
CLINICAL INTERPRETATION OF PHQ2 SCORE: NO FURTHER SCREENING NEEDED
CLINICAL INTERPRETATION OF PHQ9 SCORE: NO FURTHER SCREENING NEEDED
SUM OF ALL RESPONSES TO PHQ9 QUESTIONS 1 AND 2: 0
1. LITTLE INTEREST OR PLEASURE IN DOING THINGS: NOT AT ALL
2. FEELING DOWN, DEPRESSED OR HOPELESS: NOT AT ALL
SUM OF ALL RESPONSES TO PHQ9 QUESTIONS 1 AND 2: 0

## 2021-04-17 ASSESSMENT — PAIN SCALES - GENERAL: PAINLEVEL: 0

## 2021-04-21 ENCOUNTER — WALK IN (OUTPATIENT)
Dept: URGENT CARE | Age: 36
End: 2021-04-21

## 2021-04-21 ENCOUNTER — TELEPHONE (OUTPATIENT)
Dept: INTERNAL MEDICINE | Age: 36
End: 2021-04-21

## 2021-04-21 VITALS
TEMPERATURE: 98 F | SYSTOLIC BLOOD PRESSURE: 104 MMHG | DIASTOLIC BLOOD PRESSURE: 69 MMHG | RESPIRATION RATE: 18 BRPM | OXYGEN SATURATION: 99 % | HEART RATE: 94 BPM

## 2021-04-21 DIAGNOSIS — K57.92 ACUTE DIVERTICULITIS: ICD-10-CM

## 2021-04-21 DIAGNOSIS — R10.30 LOWER ABDOMINAL PAIN: Primary | ICD-10-CM

## 2021-04-21 PROCEDURE — 99213 OFFICE O/P EST LOW 20 MIN: CPT | Performed by: PHYSICIAN ASSISTANT

## 2021-04-21 RX ORDER — METRONIDAZOLE 500 MG/1
500 TABLET ORAL 2 TIMES DAILY
Qty: 14 TABLET | Refills: 0 | Status: SHIPPED | OUTPATIENT
Start: 2021-04-21 | End: 2021-04-21 | Stop reason: CLARIF

## 2021-04-21 RX ORDER — METRONIDAZOLE 500 MG/1
500 TABLET ORAL 3 TIMES DAILY
Qty: 21 TABLET | Refills: 0 | Status: SHIPPED | OUTPATIENT
Start: 2021-04-21 | End: 2021-04-28

## 2021-04-21 RX ORDER — CIPROFLOXACIN 500 MG/1
500 TABLET, FILM COATED ORAL 2 TIMES DAILY
Qty: 14 TABLET | Refills: 0 | Status: SHIPPED | OUTPATIENT
Start: 2021-04-21 | End: 2021-04-28

## 2021-04-21 ASSESSMENT — ENCOUNTER SYMPTOMS
HEADACHES: 0
RESPIRATORY NEGATIVE: 1
CONSTIPATION: 0
CHILLS: 0
BLOOD IN STOOL: 0
DIARRHEA: 0
CONSTITUTIONAL NEGATIVE: 1
ABDOMINAL PAIN: 1
ABDOMINAL DISTENTION: 1
FEVER: 0
NEUROLOGICAL NEGATIVE: 1
VOMITING: 0
NAUSEA: 0

## 2021-04-30 ENCOUNTER — OFFICE VISIT (OUTPATIENT)
Dept: INTERNAL MEDICINE | Age: 36
End: 2021-04-30

## 2021-04-30 VITALS
HEIGHT: 62 IN | BODY MASS INDEX: 33.86 KG/M2 | TEMPERATURE: 97.7 F | DIASTOLIC BLOOD PRESSURE: 60 MMHG | HEART RATE: 81 BPM | OXYGEN SATURATION: 99 % | RESPIRATION RATE: 18 BRPM | SYSTOLIC BLOOD PRESSURE: 100 MMHG | WEIGHT: 184 LBS

## 2021-04-30 DIAGNOSIS — K57.32 DIVERTICULITIS OF COLON: Primary | ICD-10-CM

## 2021-04-30 PROCEDURE — 99214 OFFICE O/P EST MOD 30 MIN: CPT | Performed by: INTERNAL MEDICINE

## 2021-04-30 ASSESSMENT — ENCOUNTER SYMPTOMS
NERVOUS/ANXIOUS: 1
SORE THROAT: 0
DIARRHEA: 0
FATIGUE: 1
VOMITING: 0
SEIZURES: 0
EYE PAIN: 0
ADENOPATHY: 0
ABDOMINAL PAIN: 1
BLOOD IN STOOL: 0
NAUSEA: 0
ABDOMINAL DISTENTION: 0
COUGH: 0
FEVER: 0
CONSTIPATION: 0
ANAL BLEEDING: 0
SHORTNESS OF BREATH: 0
WOUND: 0

## 2021-05-03 ENCOUNTER — TELEPHONE (OUTPATIENT)
Dept: INTERNAL MEDICINE | Age: 36
End: 2021-05-03

## 2021-05-03 ENCOUNTER — TELEPHONE (OUTPATIENT)
Dept: SCHEDULING | Age: 36
End: 2021-05-03

## 2021-05-07 ENCOUNTER — IMAGING SERVICES (OUTPATIENT)
Dept: OTHER | Age: 36
End: 2021-05-07

## 2021-05-07 DIAGNOSIS — K57.32 DIVERTICULITIS OF COLON: ICD-10-CM

## 2021-05-07 RX ORDER — AMOXICILLIN AND CLAVULANATE POTASSIUM 875; 125 MG/1; MG/1
1 TABLET, FILM COATED ORAL 2 TIMES DAILY
Qty: 14 TABLET | Refills: 0 | Status: SHIPPED | OUTPATIENT
Start: 2021-05-07 | End: 2021-05-14

## 2021-05-15 RX ORDER — KETOCONAZOLE 20 MG/ML
SHAMPOO TOPICAL
Qty: 120 ML | Refills: 1 | Status: SHIPPED | OUTPATIENT
Start: 2021-05-15 | End: 2021-07-19 | Stop reason: ALTCHOICE

## 2021-05-17 ENCOUNTER — OFFICE VISIT (OUTPATIENT)
Dept: SURGERY | Age: 36
End: 2021-05-17
Attending: SURGERY

## 2021-05-17 VITALS
RESPIRATION RATE: 16 BRPM | SYSTOLIC BLOOD PRESSURE: 96 MMHG | BODY MASS INDEX: 32.02 KG/M2 | OXYGEN SATURATION: 100 % | HEART RATE: 77 BPM | HEIGHT: 62 IN | DIASTOLIC BLOOD PRESSURE: 66 MMHG | WEIGHT: 174 LBS

## 2021-05-17 DIAGNOSIS — K57.32 DIVERTICULITIS OF COLON: Primary | ICD-10-CM

## 2021-05-17 PROCEDURE — 99204 OFFICE O/P NEW MOD 45 MIN: CPT | Performed by: SURGERY

## 2021-05-17 PROCEDURE — 99211 OFF/OP EST MAY X REQ PHY/QHP: CPT | Performed by: SURGERY

## 2021-05-17 ASSESSMENT — ENCOUNTER SYMPTOMS
RESPIRATORY NEGATIVE: 1
CONSTITUTIONAL NEGATIVE: 1
NEUROLOGICAL NEGATIVE: 1
PSYCHIATRIC NEGATIVE: 1
EYES NEGATIVE: 1
ENDOCRINE NEGATIVE: 1

## 2021-05-25 VITALS
OXYGEN SATURATION: 99 % | RESPIRATION RATE: 17 BRPM | WEIGHT: 179 LBS | DIASTOLIC BLOOD PRESSURE: 64 MMHG | OXYGEN SATURATION: 98 % | HEIGHT: 62 IN | HEART RATE: 86 BPM | HEART RATE: 83 BPM | TEMPERATURE: 98.7 F | SYSTOLIC BLOOD PRESSURE: 108 MMHG | OXYGEN SATURATION: 97 % | TEMPERATURE: 97.6 F | BODY MASS INDEX: 32.94 KG/M2 | HEIGHT: 62 IN | RESPIRATION RATE: 18 BRPM | SYSTOLIC BLOOD PRESSURE: 112 MMHG | DIASTOLIC BLOOD PRESSURE: 70 MMHG | BODY MASS INDEX: 34.23 KG/M2 | BODY MASS INDEX: 33.86 KG/M2 | DIASTOLIC BLOOD PRESSURE: 70 MMHG | HEART RATE: 84 BPM | TEMPERATURE: 97.7 F | WEIGHT: 186 LBS | WEIGHT: 184 LBS | HEIGHT: 62 IN | SYSTOLIC BLOOD PRESSURE: 100 MMHG

## 2021-06-08 ENCOUNTER — E-ADVICE (OUTPATIENT)
Dept: INTERNAL MEDICINE | Age: 36
End: 2021-06-08

## 2021-06-08 DIAGNOSIS — E55.9 VITAMIN D DEFICIENCY: Primary | ICD-10-CM

## 2021-06-18 ENCOUNTER — E-ADVICE (OUTPATIENT)
Dept: INTERNAL MEDICINE | Age: 36
End: 2021-06-18

## 2021-06-18 ENCOUNTER — V-VISIT (OUTPATIENT)
Dept: INTERNAL MEDICINE | Age: 36
End: 2021-06-18

## 2021-06-18 DIAGNOSIS — N39.0 URINARY TRACT INFECTION WITHOUT HEMATURIA, SITE UNSPECIFIED: Primary | ICD-10-CM

## 2021-06-18 PROCEDURE — 99214 OFFICE O/P EST MOD 30 MIN: CPT | Performed by: INTERNAL MEDICINE

## 2021-06-18 RX ORDER — NITROFURANTOIN 25; 75 MG/1; MG/1
100 CAPSULE ORAL 2 TIMES DAILY
Qty: 10 CAPSULE | Refills: 0 | Status: ON HOLD | OUTPATIENT
Start: 2021-06-18 | End: 2021-07-27 | Stop reason: HOSPADM

## 2021-06-18 ASSESSMENT — ENCOUNTER SYMPTOMS
VOMITING: 0
WOUND: 0
DIARRHEA: 0
BLOOD IN STOOL: 0
EYE PAIN: 0
ADENOPATHY: 0
ABDOMINAL PAIN: 0
SEIZURES: 0
NAUSEA: 0
FATIGUE: 0
COUGH: 0
CONSTIPATION: 0
SORE THROAT: 0
SWEATS: 0
FEVER: 0
CHILLS: 0
SHORTNESS OF BREATH: 0
UNEXPECTED WEIGHT CHANGE: 0

## 2021-06-21 ENCOUNTER — V-VISIT (OUTPATIENT)
Dept: SURGERY | Age: 36
End: 2021-06-21
Attending: SURGERY

## 2021-06-21 DIAGNOSIS — Z01.812 PRE-OPERATIVE LABORATORY EXAMINATION: ICD-10-CM

## 2021-06-21 DIAGNOSIS — K57.32 DIVERTICULITIS OF COLON: Primary | ICD-10-CM

## 2021-06-21 PROCEDURE — 99214 OFFICE O/P EST MOD 30 MIN: CPT | Performed by: SURGERY

## 2021-06-21 RX ORDER — METRONIDAZOLE 500 MG/1
TABLET ORAL
Qty: 6 TABLET | Refills: 0 | Status: ON HOLD | OUTPATIENT
Start: 2021-06-21 | End: 2021-07-27 | Stop reason: HOSPADM

## 2021-06-21 RX ORDER — CHLORHEXIDINE GLUCONATE 4 G/100ML
SOLUTION TOPICAL
Qty: 120 ML | Refills: 0 | Status: ON HOLD | OUTPATIENT
Start: 2021-06-21 | End: 2021-07-27 | Stop reason: HOSPADM

## 2021-06-21 RX ORDER — SODIUM, POTASSIUM,MAG SULFATES 17.5-3.13G
2 SOLUTION, RECONSTITUTED, ORAL ORAL ONCE
Qty: 1 KIT | Refills: 0 | Status: SHIPPED | OUTPATIENT
Start: 2021-06-21 | End: 2021-06-21

## 2021-06-21 RX ORDER — NEOMYCIN SULFATE 500 MG/1
TABLET ORAL
Qty: 6 TABLET | Refills: 0 | Status: ON HOLD | OUTPATIENT
Start: 2021-06-21 | End: 2021-07-27 | Stop reason: HOSPADM

## 2021-06-21 ASSESSMENT — ENCOUNTER SYMPTOMS
PSYCHIATRIC NEGATIVE: 1
NEUROLOGICAL NEGATIVE: 1
EYES NEGATIVE: 1
CONSTITUTIONAL NEGATIVE: 1
ENDOCRINE NEGATIVE: 1
RESPIRATORY NEGATIVE: 1

## 2021-06-22 ENCOUNTER — E-ADVICE (OUTPATIENT)
Dept: SURGERY | Age: 36
End: 2021-06-22

## 2021-06-22 ENCOUNTER — TELEPHONE (OUTPATIENT)
Dept: SURGERY | Age: 36
End: 2021-06-22

## 2021-06-22 DIAGNOSIS — K57.32 DIVERTICULITIS OF COLON: Primary | ICD-10-CM

## 2021-06-24 ENCOUNTER — HOSPITAL ENCOUNTER (OUTPATIENT)
Dept: GASTROENTEROLOGY | Age: 36
Discharge: HOME | End: 2021-06-24
Attending: SURGERY

## 2021-06-24 ENCOUNTER — HOSPITAL ENCOUNTER (OUTPATIENT)
Age: 36
Setting detail: SURGERY ADMIT
End: 2021-06-24
Attending: SURGERY | Admitting: SURGERY

## 2021-06-24 DIAGNOSIS — K57.32 DIVERTICULITIS OF COLON: ICD-10-CM

## 2021-06-29 ENCOUNTER — LAB SERVICES (OUTPATIENT)
Dept: LAB | Age: 36
End: 2021-06-29

## 2021-06-29 DIAGNOSIS — K57.32 DIVERTICULITIS OF COLON: ICD-10-CM

## 2021-06-29 DIAGNOSIS — K57.32 DIVERTICULITIS OF COLON: Primary | ICD-10-CM

## 2021-06-29 DIAGNOSIS — N39.0 URINARY TRACT INFECTION WITHOUT HEMATURIA, SITE UNSPECIFIED: ICD-10-CM

## 2021-06-29 DIAGNOSIS — E55.9 VITAMIN D DEFICIENCY: ICD-10-CM

## 2021-06-29 LAB
25(OH)D3+25(OH)D2 SERPL-MCNC: 26.2 NG/ML (ref 30–100)
ALBUMIN SERPL-MCNC: 3.9 G/DL (ref 3.6–5.1)
ALBUMIN/GLOB SERPL: 0.9 {RATIO} (ref 1–2.4)
ALP SERPL-CCNC: 104 UNITS/L (ref 45–117)
ALT SERPL-CCNC: 24 UNITS/L
ANION GAP SERPL CALC-SCNC: 13 MMOL/L (ref 10–20)
AST SERPL-CCNC: 26 UNITS/L
BASOPHILS # BLD: 0 K/MCL (ref 0–0.3)
BASOPHILS NFR BLD: 0 %
BILIRUB SERPL-MCNC: 0.7 MG/DL (ref 0.2–1)
BUN SERPL-MCNC: 12 MG/DL (ref 6–20)
BUN/CREAT SERPL: 18 (ref 7–25)
CALCIUM SERPL-MCNC: 8.7 MG/DL (ref 8.4–10.2)
CHLORIDE SERPL-SCNC: 109 MMOL/L (ref 98–107)
CO2 SERPL-SCNC: 22 MMOL/L (ref 21–32)
CREAT SERPL-MCNC: 0.66 MG/DL (ref 0.51–0.95)
DEPRECATED RDW RBC: 40 FL (ref 39–50)
EOSINOPHIL # BLD: 0.1 K/MCL (ref 0–0.5)
EOSINOPHIL NFR BLD: 1 %
ERYTHROCYTE [DISTWIDTH] IN BLOOD: 12.6 % (ref 11–15)
FASTING DURATION TIME PATIENT: 4 HOURS
GFR SERPLBLD BASED ON 1.73 SQ M-ARVRAT: >90 ML/MIN/1.73M2
GLOBULIN SER-MCNC: 4.2 G/DL (ref 2–4)
GLUCOSE SERPL-MCNC: 101 MG/DL (ref 65–99)
HCT VFR BLD CALC: 44.8 % (ref 36–46.5)
HGB BLD-MCNC: 14.8 G/DL (ref 12–15.5)
IMM GRANULOCYTES # BLD AUTO: 0 K/MCL (ref 0–0.2)
IMM GRANULOCYTES # BLD: 0 %
LYMPHOCYTES # BLD: 0.7 K/MCL (ref 1–4.8)
LYMPHOCYTES NFR BLD: 5 %
MCH RBC QN AUTO: 28.9 PG (ref 26–34)
MCHC RBC AUTO-ENTMCNC: 33 G/DL (ref 32–36.5)
MCV RBC AUTO: 87.5 FL (ref 78–100)
MONOCYTES # BLD: 0.5 K/MCL (ref 0.3–0.9)
MONOCYTES NFR BLD: 4 %
NEUTROPHILS # BLD: 12.6 K/MCL (ref 1.8–7.7)
NEUTROPHILS NFR BLD: 90 %
NRBC BLD MANUAL-RTO: 0 /100 WBC
PLATELET # BLD AUTO: 321 K/MCL (ref 140–450)
POTASSIUM SERPL-SCNC: 4.6 MMOL/L (ref 3.4–5.1)
PROT SERPL-MCNC: 8.1 G/DL (ref 6.4–8.2)
RBC # BLD: 5.12 MIL/MCL (ref 4–5.2)
SODIUM SERPL-SCNC: 139 MMOL/L (ref 135–145)
WBC # BLD: 14 K/MCL (ref 4.2–11)

## 2021-06-29 PROCEDURE — 82306 VITAMIN D 25 HYDROXY: CPT | Performed by: INTERNAL MEDICINE

## 2021-06-29 PROCEDURE — 80053 COMPREHEN METABOLIC PANEL: CPT | Performed by: SURGERY

## 2021-06-29 PROCEDURE — 85025 COMPLETE CBC W/AUTO DIFF WBC: CPT | Performed by: INTERNAL MEDICINE

## 2021-06-29 PROCEDURE — 36415 COLL VENOUS BLD VENIPUNCTURE: CPT | Performed by: SURGERY

## 2021-06-30 ENCOUNTER — E-ADVICE (OUTPATIENT)
Dept: SURGERY | Age: 36
End: 2021-06-30

## 2021-06-30 RX ORDER — CIPROFLOXACIN 500 MG/1
500 TABLET, FILM COATED ORAL 2 TIMES DAILY
Qty: 20 TABLET | Refills: 0 | Status: SHIPPED | OUTPATIENT
Start: 2021-06-30 | End: 2021-07-19 | Stop reason: ALTCHOICE

## 2021-06-30 RX ORDER — AMOXICILLIN AND CLAVULANATE POTASSIUM 500; 125 MG/1; MG/1
1 TABLET, FILM COATED ORAL 3 TIMES DAILY
Qty: 30 TABLET | Refills: 0 | Status: SHIPPED | OUTPATIENT
Start: 2021-06-30 | End: 2021-07-10

## 2021-06-30 RX ORDER — METRONIDAZOLE 500 MG/1
500 TABLET ORAL 3 TIMES DAILY
Qty: 30 TABLET | Refills: 0 | Status: SHIPPED | OUTPATIENT
Start: 2021-06-30 | End: 2021-07-10

## 2021-07-06 ENCOUNTER — E-ADVICE (OUTPATIENT)
Dept: INTERNAL MEDICINE | Age: 36
End: 2021-07-06

## 2021-07-07 ENCOUNTER — TELEPHONE (OUTPATIENT)
Dept: SURGERY | Age: 36
End: 2021-07-07

## 2021-07-12 ENCOUNTER — V-VISIT (OUTPATIENT)
Dept: SURGERY | Age: 36
End: 2021-07-12
Attending: SURGERY

## 2021-07-12 DIAGNOSIS — K57.32 DIVERTICULITIS OF COLON: Primary | ICD-10-CM

## 2021-07-12 PROCEDURE — 99214 OFFICE O/P EST MOD 30 MIN: CPT | Performed by: SURGERY

## 2021-07-12 RX ORDER — SODIUM, POTASSIUM,MAG SULFATES 17.5-3.13G
2 SOLUTION, RECONSTITUTED, ORAL ORAL ONCE
Qty: 1 KIT | Refills: 0 | Status: SHIPPED | OUTPATIENT
Start: 2021-07-12 | End: 2021-07-12

## 2021-07-12 RX ORDER — METRONIDAZOLE 500 MG/1
500 TABLET ORAL 3 TIMES DAILY
Qty: 30 TABLET | Refills: 0 | Status: SHIPPED | OUTPATIENT
Start: 2021-07-12 | End: 2021-07-22

## 2021-07-12 RX ORDER — CHLORHEXIDINE GLUCONATE 4 G/100ML
SOLUTION TOPICAL
Qty: 120 ML | Refills: 0 | Status: ON HOLD | OUTPATIENT
Start: 2021-07-12 | End: 2021-07-27 | Stop reason: HOSPADM

## 2021-07-12 RX ORDER — NEOMYCIN SULFATE 500 MG/1
TABLET ORAL
Qty: 6 TABLET | Refills: 0 | Status: ON HOLD | OUTPATIENT
Start: 2021-07-12 | End: 2021-07-27 | Stop reason: HOSPADM

## 2021-07-12 ASSESSMENT — ENCOUNTER SYMPTOMS
ENDOCRINE NEGATIVE: 1
NEUROLOGICAL NEGATIVE: 1
CONSTITUTIONAL NEGATIVE: 1
PSYCHIATRIC NEGATIVE: 1
RESPIRATORY NEGATIVE: 1
EYES NEGATIVE: 1

## 2021-07-14 ENCOUNTER — TELEPHONE (OUTPATIENT)
Dept: SURGERY | Age: 36
End: 2021-07-14

## 2021-07-14 ENCOUNTER — HOSPITAL ENCOUNTER (OUTPATIENT)
Age: 36
Setting detail: SURGERY ADMIT
End: 2021-07-14
Attending: SURGERY | Admitting: SURGERY

## 2021-07-14 RX ORDER — FLUCONAZOLE 150 MG/1
150 TABLET ORAL DAILY
Qty: 3 TABLET | Refills: 0 | Status: SHIPPED | OUTPATIENT
Start: 2021-07-14 | End: 2021-07-19 | Stop reason: ALTCHOICE

## 2021-07-19 ENCOUNTER — E-ADVICE (OUTPATIENT)
Dept: INTERNAL MEDICINE | Age: 36
End: 2021-07-19

## 2021-07-19 VITALS — WEIGHT: 161 LBS | BODY MASS INDEX: 28.53 KG/M2 | HEIGHT: 63 IN

## 2021-07-19 DIAGNOSIS — G24.5 BLEPHAROSPASM: Primary | ICD-10-CM

## 2021-07-19 RX ORDER — CA/D3/MAG OX/ZINC/COP/MANG/BOR 600 MG-800
1 TABLET,CHEWABLE ORAL DAILY
COMMUNITY

## 2021-07-19 ASSESSMENT — ACTIVITIES OF DAILY LIVING (ADL)
ADL_SHORT_OF_BREATH: NO
NEEDS_ASSIST: NO
SENSORY_SUPPORT_DEVICES: EYEGLASSES
ADL_BEFORE_ADMISSION: INDEPENDENT
ADL_SCORE: 12

## 2021-07-22 ENCOUNTER — TELEPHONE (OUTPATIENT)
Dept: SURGERY | Age: 36
End: 2021-07-22

## 2021-07-22 ENCOUNTER — PREP FOR CASE (OUTPATIENT)
Dept: SURGERY | Age: 36
End: 2021-07-22

## 2021-07-22 DIAGNOSIS — K57.32 DIVERTICULITIS OF COLON: Primary | ICD-10-CM

## 2021-07-22 RX ORDER — ALVIMOPAN 12 MG/1
12 CAPSULE ORAL
Status: CANCELLED | OUTPATIENT
Start: 2021-07-22 | End: 2021-07-30

## 2021-07-22 RX ORDER — CHLORHEXIDINE GLUCONATE ORAL RINSE 1.2 MG/ML
15 SOLUTION DENTAL
Status: CANCELLED | OUTPATIENT
Start: 2021-07-22

## 2021-07-22 RX ORDER — ACETAMINOPHEN 500 MG
1000 TABLET ORAL
Status: CANCELLED | OUTPATIENT
Start: 2021-07-22

## 2021-07-22 RX ORDER — GABAPENTIN 300 MG/1
600 CAPSULE ORAL
Status: CANCELLED | OUTPATIENT
Start: 2021-07-22

## 2021-07-22 RX ORDER — CELECOXIB 200 MG/1
400 CAPSULE ORAL
Status: CANCELLED | OUTPATIENT
Start: 2021-07-22

## 2021-07-22 RX ORDER — ENOXAPARIN SODIUM 100 MG/ML
40 INJECTION SUBCUTANEOUS DAILY
Status: CANCELLED | OUTPATIENT
Start: 2021-07-22

## 2021-07-24 ENCOUNTER — APPOINTMENT (OUTPATIENT)
Dept: LAB | Age: 36
End: 2021-07-24

## 2021-07-24 ENCOUNTER — LAB SERVICES (OUTPATIENT)
Dept: LAB | Age: 36
End: 2021-07-24

## 2021-07-24 DIAGNOSIS — Z01.812 PRE-OPERATIVE LABORATORY EXAMINATION: ICD-10-CM

## 2021-07-24 PROCEDURE — U0003 INFECTIOUS AGENT DETECTION BY NUCLEIC ACID (DNA OR RNA); SEVERE ACUTE RESPIRATORY SYNDROME CORONAVIRUS 2 (SARS-COV-2) (CORONAVIRUS DISEASE [COVID-19]), AMPLIFIED PROBE TECHNIQUE, MAKING USE OF HIGH THROUGHPUT TECHNOLOGIES AS DESCRIBED BY CMS-2020-01-R: HCPCS | Performed by: SURGERY

## 2021-07-24 PROCEDURE — U0005 INFEC AGEN DETEC AMPLI PROBE: HCPCS | Performed by: SURGERY

## 2021-07-25 LAB
SARS-COV-2 RNA RESP QL NAA+PROBE: NOT DETECTED
SERVICE CMNT-IMP: NORMAL
SERVICE CMNT-IMP: NORMAL

## 2021-07-26 ENCOUNTER — TELEPHONE (OUTPATIENT)
Dept: SURGERY | Age: 36
End: 2021-07-26

## 2021-07-26 ASSESSMENT — ACTIVITIES OF DAILY LIVING (ADL)
ADL_BEFORE_ADMISSION: INDEPENDENT
HISTORY OF FALLING IN THE LAST YEAR (PRIOR TO ADMISSION): NO
SENSORY_SUPPORT_DEVICES: EYEGLASSES
ADL_SCORE: 12

## 2021-07-27 ENCOUNTER — ANESTHESIA (OUTPATIENT)
Dept: SURGERY | Age: 36
DRG: 331 | End: 2021-07-27

## 2021-07-27 ENCOUNTER — HOSPITAL ENCOUNTER (OUTPATIENT)
Dept: GASTROENTEROLOGY | Age: 36
Discharge: HOME | DRG: 331 | End: 2021-07-27
Attending: SURGERY

## 2021-07-27 ENCOUNTER — ANESTHESIA EVENT (OUTPATIENT)
Dept: SURGERY | Age: 36
DRG: 331 | End: 2021-07-27

## 2021-07-27 ENCOUNTER — HOSPITAL ENCOUNTER (INPATIENT)
Age: 36
LOS: 2 days | Discharge: HOME | DRG: 331 | End: 2021-07-29
Attending: SURGERY | Admitting: SURGERY

## 2021-07-27 DIAGNOSIS — K57.32 DIVERTICULITIS OF COLON: ICD-10-CM

## 2021-07-27 LAB
ABO + RH BLD: NORMAL
B-HCG UR QL: NEGATIVE
BLD GP AB SCN SERPL QL GEL: NEGATIVE
GLUCOSE BLDC GLUCOMTR-MCNC: 132 MG/DL (ref 70–99)
GLUCOSE BLDC GLUCOMTR-MCNC: 133 MG/DL (ref 70–99)
INTERNAL PROCEDURAL CONTROLS ACCEPTABLE: YES
TYPE AND SCREEN EXPIRATION DATE: NORMAL

## 2021-07-27 PROCEDURE — 13000099 HB GENERAL ROBOTIC CASE EA ADD MINUTE: Performed by: SURGERY

## 2021-07-27 PROCEDURE — 10002801 HB RX 250 W/O HCPCS: Performed by: INTERNAL MEDICINE

## 2021-07-27 PROCEDURE — 10002801 HB RX 250 W/O HCPCS: Performed by: STUDENT IN AN ORGANIZED HEALTH CARE EDUCATION/TRAINING PROGRAM

## 2021-07-27 PROCEDURE — 10002800 HB RX 250 W HCPCS: Performed by: SURGERY

## 2021-07-27 PROCEDURE — 0DBN4ZZ EXCISION OF SIGMOID COLON, PERCUTANEOUS ENDOSCOPIC APPROACH: ICD-10-PCS | Performed by: SURGERY

## 2021-07-27 PROCEDURE — 10002800 HB RX 250 W HCPCS: Performed by: STUDENT IN AN ORGANIZED HEALTH CARE EDUCATION/TRAINING PROGRAM

## 2021-07-27 PROCEDURE — 10002803 HB RX 637: Performed by: STUDENT IN AN ORGANIZED HEALTH CARE EDUCATION/TRAINING PROGRAM

## 2021-07-27 PROCEDURE — 10004651 HB RX, NO CHARGE ITEM: Performed by: SURGERY

## 2021-07-27 PROCEDURE — 86850 RBC ANTIBODY SCREEN: CPT | Performed by: SURGERY

## 2021-07-27 PROCEDURE — 10002801 HB RX 250 W/O HCPCS: Performed by: SURGERY

## 2021-07-27 PROCEDURE — 0DNU4ZZ RELEASE OMENTUM, PERCUTANEOUS ENDOSCOPIC APPROACH: ICD-10-PCS | Performed by: SURGERY

## 2021-07-27 PROCEDURE — 10002807 HB RX 258: Performed by: SURGERY

## 2021-07-27 PROCEDURE — C9290 INJ, BUPIVACAINE LIPOSOME: HCPCS | Performed by: INTERNAL MEDICINE

## 2021-07-27 PROCEDURE — 44207 L COLECTOMY/COLOPROCTOSTOMY: CPT | Performed by: SURGERY

## 2021-07-27 PROCEDURE — 10006027 HB SUPPLY 278: Performed by: SURGERY

## 2021-07-27 PROCEDURE — B41BYZZ FLUOROSCOPY OF OTHER INTRA-ABDOMINAL ARTERIES USING OTHER CONTRAST: ICD-10-PCS | Performed by: SURGERY

## 2021-07-27 PROCEDURE — 10004451 HB PACU RECOVERY 1ST 30 MINUTES: Performed by: SURGERY

## 2021-07-27 PROCEDURE — 49905 OMENTAL FLAP INTRA-ABDOM: CPT | Performed by: SURGERY

## 2021-07-27 PROCEDURE — 88307 TISSUE EXAM BY PATHOLOGIST: CPT | Performed by: SURGERY

## 2021-07-27 PROCEDURE — 44213 LAP MOBIL SPLENIC FL ADD-ON: CPT | Performed by: SURGERY

## 2021-07-27 PROCEDURE — 10002803 HB RX 637: Performed by: SURGERY

## 2021-07-27 PROCEDURE — 10002800 HB RX 250 W HCPCS: Performed by: INTERNAL MEDICINE

## 2021-07-27 PROCEDURE — 10002807 HB RX 258: Performed by: STUDENT IN AN ORGANIZED HEALTH CARE EDUCATION/TRAINING PROGRAM

## 2021-07-27 PROCEDURE — 0DJD8ZZ INSPECTION OF LOWER INTESTINAL TRACT, VIA NATURAL OR ARTIFICIAL OPENING ENDOSCOPIC: ICD-10-PCS | Performed by: SURGERY

## 2021-07-27 PROCEDURE — 81025 URINE PREGNANCY TEST: CPT | Performed by: SURGERY

## 2021-07-27 PROCEDURE — 10000002 HB ROOM CHARGE MED SURG

## 2021-07-27 PROCEDURE — 10004452 HB PACU ADDL 30 MINUTES: Performed by: SURGERY

## 2021-07-27 PROCEDURE — 82962 GLUCOSE BLOOD TEST: CPT

## 2021-07-27 PROCEDURE — 13000003 HB ANESTHESIA  GENERAL EA ADD MINUTE: Performed by: SURGERY

## 2021-07-27 PROCEDURE — 13000098 HB GENERAL ROBOTIC CASE S/U + 1ST 15 MIN: Performed by: SURGERY

## 2021-07-27 PROCEDURE — 8E0W4CZ ROBOTIC ASSISTED PROCEDURE OF TRUNK REGION, PERCUTANEOUS ENDOSCOPIC APPROACH: ICD-10-PCS | Performed by: SURGERY

## 2021-07-27 PROCEDURE — 13000002 HB ANESTHESIA  GENERAL  S/U + 1ST 15 MIN: Performed by: SURGERY

## 2021-07-27 PROCEDURE — 10002016 HB COUNTER INCENTIVE SPIROMETRY

## 2021-07-27 PROCEDURE — 10004651 HB RX, NO CHARGE ITEM: Performed by: STUDENT IN AN ORGANIZED HEALTH CARE EDUCATION/TRAINING PROGRAM

## 2021-07-27 PROCEDURE — 10006023 HB SUPPLY 272: Performed by: SURGERY

## 2021-07-27 DEVICE — STAPLER 60 RELOAD BLUE
Type: IMPLANTABLE DEVICE | Site: ABDOMEN | Status: FUNCTIONAL
Brand: SUREFORM

## 2021-07-27 DEVICE — STAPLER WITH DST SERIES TECHNOLOGY
Type: IMPLANTABLE DEVICE | Site: ABDOMEN | Status: FUNCTIONAL
Brand: GIA

## 2021-07-27 RX ORDER — CHLORHEXIDINE GLUCONATE ORAL RINSE 1.2 MG/ML
15 SOLUTION DENTAL
Status: COMPLETED | OUTPATIENT
Start: 2021-07-27 | End: 2021-07-27

## 2021-07-27 RX ORDER — GABAPENTIN 300 MG/1
300 CAPSULE ORAL EVERY 8 HOURS SCHEDULED
Qty: 21 CAPSULE | Refills: 0 | Status: SHIPPED | OUTPATIENT
Start: 2021-07-27 | End: 2021-08-10 | Stop reason: ALTCHOICE

## 2021-07-27 RX ORDER — TRAMADOL HYDROCHLORIDE 50 MG/1
25 TABLET ORAL EVERY 4 HOURS PRN
Status: DISCONTINUED | OUTPATIENT
Start: 2021-07-27 | End: 2021-07-29 | Stop reason: HOSPADM

## 2021-07-27 RX ORDER — ALVIMOPAN 12 MG/1
12 CAPSULE ORAL
Status: COMPLETED | OUTPATIENT
Start: 2021-07-27 | End: 2021-07-27

## 2021-07-27 RX ORDER — PHENYLEPHRINE HYDROCHLORIDE 10 MG/ML
INJECTION, SOLUTION INTRAMUSCULAR; INTRAVENOUS; SUBCUTANEOUS PRN
Status: DISCONTINUED | OUTPATIENT
Start: 2021-07-27 | End: 2021-07-27

## 2021-07-27 RX ORDER — EPHEDRINE SULFATE/0.9% NACL/PF 50 MG/10ML
SYRINGE (ML) INTRAVENOUS PRN
Status: DISCONTINUED | OUTPATIENT
Start: 2021-07-27 | End: 2021-07-27

## 2021-07-27 RX ORDER — SODIUM CHLORIDE, SODIUM LACTATE, POTASSIUM CHLORIDE, CALCIUM CHLORIDE 600; 310; 30; 20 MG/100ML; MG/100ML; MG/100ML; MG/100ML
INJECTION, SOLUTION INTRAVENOUS CONTINUOUS
Status: DISCONTINUED | OUTPATIENT
Start: 2021-07-27 | End: 2021-07-29

## 2021-07-27 RX ORDER — NEOSTIGMINE METHYLSULFATE 1 MG/ML
INJECTION, SOLUTION INTRAVENOUS PRN
Status: DISCONTINUED | OUTPATIENT
Start: 2021-07-27 | End: 2021-07-27

## 2021-07-27 RX ORDER — ENOXAPARIN SODIUM 100 MG/ML
40 INJECTION SUBCUTANEOUS DAILY
Status: DISCONTINUED | OUTPATIENT
Start: 2021-07-27 | End: 2021-07-27 | Stop reason: HOSPADM

## 2021-07-27 RX ORDER — METOCLOPRAMIDE HYDROCHLORIDE 5 MG/ML
5 INJECTION INTRAMUSCULAR; INTRAVENOUS EVERY 6 HOURS PRN
Status: DISCONTINUED | OUTPATIENT
Start: 2021-07-27 | End: 2021-07-27

## 2021-07-27 RX ORDER — GABAPENTIN 250 MG/5ML
300 SOLUTION ORAL EVERY 8 HOURS
Status: DISCONTINUED | OUTPATIENT
Start: 2021-07-27 | End: 2021-07-29 | Stop reason: HOSPADM

## 2021-07-27 RX ORDER — ONDANSETRON 2 MG/ML
4 INJECTION INTRAMUSCULAR; INTRAVENOUS 2 TIMES DAILY PRN
Status: DISCONTINUED | OUTPATIENT
Start: 2021-07-27 | End: 2021-07-27

## 2021-07-27 RX ORDER — ONDANSETRON 2 MG/ML
4 INJECTION INTRAMUSCULAR; INTRAVENOUS EVERY 12 HOURS PRN
Status: DISCONTINUED | OUTPATIENT
Start: 2021-07-27 | End: 2021-07-29 | Stop reason: HOSPADM

## 2021-07-27 RX ORDER — PROCHLORPERAZINE EDISYLATE 5 MG/ML
5 INJECTION INTRAMUSCULAR; INTRAVENOUS EVERY 4 HOURS PRN
Status: DISCONTINUED | OUTPATIENT
Start: 2021-07-27 | End: 2021-07-27 | Stop reason: HOSPADM

## 2021-07-27 RX ORDER — DIPHENHYDRAMINE HYDROCHLORIDE 50 MG/ML
25 INJECTION INTRAMUSCULAR; INTRAVENOUS EVERY 4 HOURS PRN
Status: DISCONTINUED | OUTPATIENT
Start: 2021-07-27 | End: 2021-07-27 | Stop reason: HOSPADM

## 2021-07-27 RX ORDER — MAGNESIUM HYDROXIDE 1200 MG/15ML
LIQUID ORAL PRN
Status: DISCONTINUED | OUTPATIENT
Start: 2021-07-27 | End: 2021-07-27 | Stop reason: HOSPADM

## 2021-07-27 RX ORDER — ONDANSETRON 2 MG/ML
INJECTION INTRAMUSCULAR; INTRAVENOUS PRN
Status: DISCONTINUED | OUTPATIENT
Start: 2021-07-27 | End: 2021-07-27

## 2021-07-27 RX ORDER — DIPHENHYDRAMINE HYDROCHLORIDE 50 MG/ML
25 INJECTION INTRAMUSCULAR; INTRAVENOUS EVERY 4 HOURS PRN
Status: DISCONTINUED | OUTPATIENT
Start: 2021-07-27 | End: 2021-07-27

## 2021-07-27 RX ORDER — DEXAMETHASONE SODIUM PHOSPHATE 4 MG/ML
4 INJECTION, SOLUTION INTRA-ARTICULAR; INTRALESIONAL; INTRAMUSCULAR; INTRAVENOUS; SOFT TISSUE
Status: DISCONTINUED | OUTPATIENT
Start: 2021-07-27 | End: 2021-07-27 | Stop reason: HOSPADM

## 2021-07-27 RX ORDER — CELECOXIB 200 MG/1
400 CAPSULE ORAL
Status: COMPLETED | OUTPATIENT
Start: 2021-07-27 | End: 2021-07-27

## 2021-07-27 RX ORDER — ACETAMINOPHEN 500 MG
1000 TABLET ORAL EVERY 8 HOURS SCHEDULED
Status: DISCONTINUED | OUTPATIENT
Start: 2021-07-27 | End: 2021-07-27

## 2021-07-27 RX ORDER — CELECOXIB 200 MG/1
200 CAPSULE ORAL EVERY 12 HOURS SCHEDULED
Qty: 14 CAPSULE | Refills: 0 | Status: SHIPPED | OUTPATIENT
Start: 2021-07-28 | End: 2021-08-10 | Stop reason: ALTCHOICE

## 2021-07-27 RX ORDER — ACETAMINOPHEN 500 MG
1000 TABLET ORAL
Status: COMPLETED | OUTPATIENT
Start: 2021-07-27 | End: 2021-07-27

## 2021-07-27 RX ORDER — GABAPENTIN 300 MG/1
300 CAPSULE ORAL EVERY 8 HOURS SCHEDULED
Status: DISCONTINUED | OUTPATIENT
Start: 2021-07-27 | End: 2021-07-27

## 2021-07-27 RX ORDER — DEXAMETHASONE SODIUM PHOSPHATE 4 MG/ML
4 INJECTION, SOLUTION INTRA-ARTICULAR; INTRALESIONAL; INTRAMUSCULAR; INTRAVENOUS; SOFT TISSUE
Status: DISCONTINUED | OUTPATIENT
Start: 2021-07-27 | End: 2021-07-27

## 2021-07-27 RX ORDER — PROPOFOL 10 MG/ML
INJECTION, EMULSION INTRAVENOUS PRN
Status: DISCONTINUED | OUTPATIENT
Start: 2021-07-27 | End: 2021-07-27

## 2021-07-27 RX ORDER — ACETAMINOPHEN 325 MG/1
650 TABLET ORAL EVERY 6 HOURS
Qty: 56 TABLET | Refills: 0 | Status: SHIPPED | OUTPATIENT
Start: 2021-07-27 | End: 2021-08-03

## 2021-07-27 RX ORDER — SODIUM CHLORIDE 9 MG/ML
INJECTION, SOLUTION INTRAVENOUS CONTINUOUS PRN
Status: DISCONTINUED | OUTPATIENT
Start: 2021-07-27 | End: 2021-07-27

## 2021-07-27 RX ORDER — ROCURONIUM BROMIDE 10 MG/ML
INJECTION, SOLUTION INTRAVENOUS PRN
Status: DISCONTINUED | OUTPATIENT
Start: 2021-07-27 | End: 2021-07-27

## 2021-07-27 RX ORDER — DEXAMETHASONE SODIUM PHOSPHATE 4 MG/ML
INJECTION, SOLUTION INTRA-ARTICULAR; INTRALESIONAL; INTRAMUSCULAR; INTRAVENOUS; SOFT TISSUE PRN
Status: DISCONTINUED | OUTPATIENT
Start: 2021-07-27 | End: 2021-07-27

## 2021-07-27 RX ORDER — ACETAMINOPHEN 160 MG/5ML
100 LIQUID ORAL EVERY 8 HOURS
Status: DISCONTINUED | OUTPATIENT
Start: 2021-07-27 | End: 2021-07-27

## 2021-07-27 RX ORDER — GABAPENTIN 300 MG/1
600 CAPSULE ORAL
Status: COMPLETED | OUTPATIENT
Start: 2021-07-27 | End: 2021-07-27

## 2021-07-27 RX ORDER — PROCHLORPERAZINE EDISYLATE 5 MG/ML
5 INJECTION INTRAMUSCULAR; INTRAVENOUS EVERY 4 HOURS PRN
Status: DISCONTINUED | OUTPATIENT
Start: 2021-07-27 | End: 2021-07-27

## 2021-07-27 RX ORDER — ACETAMINOPHEN 160 MG/5ML
1000 LIQUID ORAL EVERY 8 HOURS
Status: DISCONTINUED | OUTPATIENT
Start: 2021-07-27 | End: 2021-07-29 | Stop reason: HOSPADM

## 2021-07-27 RX ORDER — ONDANSETRON 4 MG/1
4 TABLET, ORALLY DISINTEGRATING ORAL EVERY 12 HOURS PRN
Status: DISCONTINUED | OUTPATIENT
Start: 2021-07-27 | End: 2021-07-29 | Stop reason: HOSPADM

## 2021-07-27 RX ORDER — ENOXAPARIN SODIUM 100 MG/ML
40 INJECTION SUBCUTANEOUS NIGHTLY
Status: DISCONTINUED | OUTPATIENT
Start: 2021-07-28 | End: 2021-07-29 | Stop reason: HOSPADM

## 2021-07-27 RX ORDER — LIDOCAINE HYDROCHLORIDE 40 MG/ML
SOLUTION TOPICAL PRN
Status: DISCONTINUED | OUTPATIENT
Start: 2021-07-27 | End: 2021-07-27

## 2021-07-27 RX ORDER — GLYCOPYRROLATE 0.2 MG/ML
INJECTION, SOLUTION INTRAMUSCULAR; INTRAVENOUS PRN
Status: DISCONTINUED | OUTPATIENT
Start: 2021-07-27 | End: 2021-07-27

## 2021-07-27 RX ORDER — CELECOXIB 200 MG/1
200 CAPSULE ORAL EVERY 12 HOURS SCHEDULED
Status: DISCONTINUED | OUTPATIENT
Start: 2021-07-28 | End: 2021-07-29 | Stop reason: HOSPADM

## 2021-07-27 RX ORDER — SODIUM CHLORIDE, SODIUM LACTATE, POTASSIUM CHLORIDE, CALCIUM CHLORIDE 600; 310; 30; 20 MG/100ML; MG/100ML; MG/100ML; MG/100ML
INJECTION, SOLUTION INTRAVENOUS CONTINUOUS PRN
Status: DISCONTINUED | OUTPATIENT
Start: 2021-07-27 | End: 2021-07-27

## 2021-07-27 RX ORDER — BUPIVACAINE HYDROCHLORIDE 2.5 MG/ML
INJECTION, SOLUTION EPIDURAL; INFILTRATION; INTRACAUDAL PRN
Status: DISCONTINUED | OUTPATIENT
Start: 2021-07-27 | End: 2021-07-27

## 2021-07-27 RX ORDER — MIDAZOLAM HYDROCHLORIDE 1 MG/ML
INJECTION, SOLUTION INTRAMUSCULAR; INTRAVENOUS PRN
Status: DISCONTINUED | OUTPATIENT
Start: 2021-07-27 | End: 2021-07-27

## 2021-07-27 RX ORDER — BUPIVACAINE HYDROCHLORIDE AND EPINEPHRINE 5; 5 MG/ML; UG/ML
INJECTION, SOLUTION EPIDURAL; INTRACAUDAL; PERINEURAL PRN
Status: DISCONTINUED | OUTPATIENT
Start: 2021-07-27 | End: 2021-07-27 | Stop reason: HOSPADM

## 2021-07-27 RX ORDER — LIDOCAINE HYDROCHLORIDE 20 MG/ML
INJECTION, SOLUTION INFILTRATION; PERINEURAL PRN
Status: DISCONTINUED | OUTPATIENT
Start: 2021-07-27 | End: 2021-07-27

## 2021-07-27 RX ADMIN — PHENYLEPHRINE HYDROCHLORIDE 150 MCG: 10 INJECTION INTRAVENOUS at 07:46

## 2021-07-27 RX ADMIN — GABAPENTIN 300 MG: 250 SOLUTION ORAL at 22:29

## 2021-07-27 RX ADMIN — PROPOFOL 150 MG: 10 INJECTION, EMULSION INTRAVENOUS at 07:42

## 2021-07-27 RX ADMIN — ROCURONIUM BROMIDE 25 MG: 50 INJECTION, SOLUTION INTRAVENOUS at 09:21

## 2021-07-27 RX ADMIN — KETOROLAC TROMETHAMINE 30 MG: 30 INJECTION, SOLUTION INTRAMUSCULAR; INTRAVENOUS at 12:45

## 2021-07-27 RX ADMIN — LIDOCAINE HYDROCHLORIDE 1 APPLICATION.: 40 SOLUTION TOPICAL at 07:42

## 2021-07-27 RX ADMIN — ACETAMINOPHEN 1000 MG: 650 SOLUTION ORAL at 16:04

## 2021-07-27 RX ADMIN — ACETAMINOPHEN 1000 MG: 650 SOLUTION ORAL at 22:29

## 2021-07-27 RX ADMIN — KETOROLAC TROMETHAMINE 30 MG: 30 INJECTION, SOLUTION INTRAMUSCULAR; INTRAVENOUS at 18:14

## 2021-07-27 RX ADMIN — ROCURONIUM BROMIDE 20 MG: 50 INJECTION, SOLUTION INTRAVENOUS at 07:54

## 2021-07-27 RX ADMIN — FENTANYL CITRATE 25 MCG: 50 INJECTION INTRAMUSCULAR; INTRAVENOUS at 12:41

## 2021-07-27 RX ADMIN — PHENYLEPHRINE HYDROCHLORIDE 100 MCG: 10 INJECTION INTRAVENOUS at 11:00

## 2021-07-27 RX ADMIN — BUPIVACAINE 10 ML: 13.3 INJECTION, SUSPENSION, LIPOSOMAL INFILTRATION at 07:51

## 2021-07-27 RX ADMIN — ROCURONIUM BROMIDE 20 MG: 50 INJECTION, SOLUTION INTRAVENOUS at 08:24

## 2021-07-27 RX ADMIN — CHLORHEXIDINE GLUCONATE 15 ML: 1.2 RINSE ORAL at 06:25

## 2021-07-27 RX ADMIN — MIDAZOLAM HYDROCHLORIDE 2 MG: 1 INJECTION, SOLUTION INTRAMUSCULAR; INTRAVENOUS at 07:42

## 2021-07-27 RX ADMIN — KETOROLAC TROMETHAMINE 30 MG: 30 INJECTION, SOLUTION INTRAMUSCULAR; INTRAVENOUS at 23:56

## 2021-07-27 RX ADMIN — NEOSTIGMINE METHYLSULFATE 4 MG: 1 INJECTION INTRAVENOUS at 11:13

## 2021-07-27 RX ADMIN — DEXAMETHASONE SODIUM PHOSPHATE 8 MG: 4 INJECTION, SOLUTION INTRAMUSCULAR; INTRAVENOUS at 11:00

## 2021-07-27 RX ADMIN — ENOXAPARIN SODIUM 40 MG: 100 INJECTION SUBCUTANEOUS at 06:25

## 2021-07-27 RX ADMIN — ROCURONIUM BROMIDE 10 MG: 50 INJECTION, SOLUTION INTRAVENOUS at 10:32

## 2021-07-27 RX ADMIN — LIDOCAINE HYDROCHLORIDE 60 MG: 20 INJECTION, SOLUTION INFILTRATION; PERINEURAL at 07:42

## 2021-07-27 RX ADMIN — ACETAMINOPHEN 1000 MG: 500 TABLET ORAL at 06:25

## 2021-07-27 RX ADMIN — SODIUM CHLORIDE: 9 INJECTION, SOLUTION INTRAVENOUS at 08:05

## 2021-07-27 RX ADMIN — BUPIVACAINE 10 ML: 13.3 INJECTION, SUSPENSION, LIPOSOMAL INFILTRATION at 07:49

## 2021-07-27 RX ADMIN — PHENYLEPHRINE HYDROCHLORIDE 100 MCG: 10 INJECTION INTRAVENOUS at 10:44

## 2021-07-27 RX ADMIN — ERTAPENEM SODIUM 1000 MG: 1 INJECTION, POWDER, LYOPHILIZED, FOR SOLUTION INTRAMUSCULAR; INTRAVENOUS at 08:10

## 2021-07-27 RX ADMIN — GABAPENTIN 600 MG: 300 CAPSULE ORAL at 06:25

## 2021-07-27 RX ADMIN — BUPIVACAINE HYDROCHLORIDE 20 ML: 2.5 INJECTION, SOLUTION EPIDURAL; INFILTRATION; INTRACAUDAL at 07:51

## 2021-07-27 RX ADMIN — GABAPENTIN 300 MG: 250 SOLUTION ORAL at 16:04

## 2021-07-27 RX ADMIN — PHENYLEPHRINE HYDROCHLORIDE 150 MCG: 10 INJECTION INTRAVENOUS at 10:25

## 2021-07-27 RX ADMIN — ROCURONIUM BROMIDE 5 MG: 50 INJECTION, SOLUTION INTRAVENOUS at 07:42

## 2021-07-27 RX ADMIN — CELECOXIB 400 MG: 200 CAPSULE ORAL at 06:25

## 2021-07-27 RX ADMIN — SODIUM CHLORIDE, POTASSIUM CHLORIDE, SODIUM LACTATE AND CALCIUM CHLORIDE: 600; 310; 30; 20 INJECTION, SOLUTION INTRAVENOUS at 07:42

## 2021-07-27 RX ADMIN — Medication 10 MG: at 08:13

## 2021-07-27 RX ADMIN — SODIUM CHLORIDE, POTASSIUM CHLORIDE, SODIUM LACTATE AND CALCIUM CHLORIDE: 600; 310; 30; 20 INJECTION, SOLUTION INTRAVENOUS at 09:55

## 2021-07-27 RX ADMIN — ONDANSETRON 4 MG: 2 INJECTION INTRAMUSCULAR; INTRAVENOUS at 10:59

## 2021-07-27 RX ADMIN — BUPIVACAINE HYDROCHLORIDE 20 ML: 2.5 INJECTION, SOLUTION EPIDURAL; INFILTRATION; INTRACAUDAL at 07:49

## 2021-07-27 RX ADMIN — ALVIMOPAN 12 MG: 12 CAPSULE ORAL at 06:25

## 2021-07-27 RX ADMIN — GLYCOPYRROLATE 0.6 MG: 0.2 INJECTION, SOLUTION INTRAMUSCULAR; INTRAVENOUS at 11:13

## 2021-07-27 RX ADMIN — FENTANYL CITRATE 100 MCG: 50 INJECTION, SOLUTION INTRAMUSCULAR; INTRAVENOUS at 07:42

## 2021-07-27 ASSESSMENT — PAIN SCALES - GENERAL
PAINLEVEL_OUTOF10: 9
PAINLEVEL_OUTOF10: 4
PAINLEVEL_OUTOF10: 0
PAINLEVEL_OUTOF10: 0
PAINLEVEL_OUTOF10: 7
PAINLEVEL_OUTOF10: 0
PAINLEVEL_OUTOF10: 2
PAINLEVEL_OUTOF10: 4
PAINLEVEL_OUTOF10: 0
PAINLEVEL_OUTOF10: 4
PAINLEVEL_OUTOF10: 4
PAINLEVEL_OUTOF10: 3
PAINLEVEL_OUTOF10: 6

## 2021-07-27 ASSESSMENT — COGNITIVE AND FUNCTIONAL STATUS - GENERAL
ARE YOU DEAF OR DO YOU HAVE SERIOUS DIFFICULTY  HEARING: NO
DO YOU HAVE SERIOUS DIFFICULTY WALKING OR CLIMBING STAIRS: NO
ARE YOU BLIND OR DO YOU HAVE SERIOUS DIFFICULTY SEEING, EVEN WHEN WEARING GLASSES: NO
DO YOU HAVE DIFFICULTY DRESSING OR BATHING: NO
BECAUSE OF A PHYSICAL, MENTAL, OR EMOTIONAL CONDITION, DO YOU HAVE SERIOUS DIFFICULTY CONCENTRATING, REMEMBERING OR MAKING DECISIONS: NO
BECAUSE OF A PHYSICAL, MENTAL, OR EMOTIONAL CONDITION, DO YOU HAVE DIFFICULTY DOING ERRANDS ALONE: NO

## 2021-07-27 ASSESSMENT — LIFESTYLE VARIABLES
AUDIT-C TOTAL SCORE: 0
HOW MANY STANDARD DRINKS CONTAINING ALCOHOL DO YOU HAVE ON A TYPICAL DAY: 0,1 OR 2
HOW OFTEN DO YOU HAVE 6 OR MORE DRINKS ON ONE OCCASION: NEVER
HOW OFTEN DO YOU HAVE A DRINK CONTAINING ALCOHOL: NEVER
ALCOHOL_USE_STATUS: NO OR LOW RISK WITH VALIDATED TOOL

## 2021-07-27 ASSESSMENT — ACTIVITIES OF DAILY LIVING (ADL)
RECENT_DECLINE_ADL: NO
ADL_SCORE: 12
CHRONIC_PAIN_PRESENT: NO
ADL_BEFORE_ADMISSION: INDEPENDENT
ADL_SHORT_OF_BREATH: NO

## 2021-07-28 LAB
ANION GAP SERPL CALC-SCNC: 9 MMOL/L (ref 10–20)
BASOPHILS # BLD: 0 K/MCL (ref 0–0.3)
BASOPHILS NFR BLD: 0 %
BUN SERPL-MCNC: 4 MG/DL (ref 6–20)
BUN/CREAT SERPL: 7 (ref 7–25)
CALCIUM SERPL-MCNC: 8 MG/DL (ref 8.4–10.2)
CHLORIDE SERPL-SCNC: 110 MMOL/L (ref 98–107)
CO2 SERPL-SCNC: 25 MMOL/L (ref 21–32)
CREAT SERPL-MCNC: 0.59 MG/DL (ref 0.51–0.95)
DEPRECATED RDW RBC: 40.1 FL (ref 39–50)
EOSINOPHIL # BLD: 0 K/MCL (ref 0–0.5)
EOSINOPHIL NFR BLD: 0 %
ERYTHROCYTE [DISTWIDTH] IN BLOOD: 12.7 % (ref 11–15)
FASTING DURATION TIME PATIENT: ABNORMAL H
GFR SERPLBLD BASED ON 1.73 SQ M-ARVRAT: >90 ML/MIN/1.73M2
GLUCOSE SERPL-MCNC: 106 MG/DL (ref 65–99)
HCT VFR BLD CALC: 34.2 % (ref 36–46.5)
HGB BLD-MCNC: 11.4 G/DL (ref 12–15.5)
IMM GRANULOCYTES # BLD AUTO: 0.1 K/MCL (ref 0–0.2)
IMM GRANULOCYTES # BLD: 0 %
LYMPHOCYTES # BLD: 1.5 K/MCL (ref 1–4.8)
LYMPHOCYTES NFR BLD: 13 %
MAGNESIUM SERPL-MCNC: 1.6 MG/DL (ref 1.7–2.4)
MCH RBC QN AUTO: 29.3 PG (ref 26–34)
MCHC RBC AUTO-ENTMCNC: 33.3 G/DL (ref 32–36.5)
MCV RBC AUTO: 87.9 FL (ref 78–100)
MONOCYTES # BLD: 1.1 K/MCL (ref 0.3–0.9)
MONOCYTES NFR BLD: 10 %
NEUTROPHILS # BLD: 9.2 K/MCL (ref 1.8–7.7)
NEUTROPHILS NFR BLD: 77 %
NRBC BLD MANUAL-RTO: 0 /100 WBC
PHOSPHATE SERPL-MCNC: 3.4 MG/DL (ref 2.4–4.7)
PLATELET # BLD AUTO: 233 K/MCL (ref 140–450)
POTASSIUM SERPL-SCNC: 3.4 MMOL/L (ref 3.4–5.1)
RBC # BLD: 3.89 MIL/MCL (ref 4–5.2)
SODIUM SERPL-SCNC: 141 MMOL/L (ref 135–145)
WBC # BLD: 11.9 K/MCL (ref 4.2–11)

## 2021-07-28 PROCEDURE — 10000002 HB ROOM CHARGE MED SURG

## 2021-07-28 PROCEDURE — 36415 COLL VENOUS BLD VENIPUNCTURE: CPT | Performed by: STUDENT IN AN ORGANIZED HEALTH CARE EDUCATION/TRAINING PROGRAM

## 2021-07-28 PROCEDURE — 97165 OT EVAL LOW COMPLEX 30 MIN: CPT

## 2021-07-28 PROCEDURE — 10002803 HB RX 637: Performed by: SURGERY

## 2021-07-28 PROCEDURE — 10002803 HB RX 637: Performed by: STUDENT IN AN ORGANIZED HEALTH CARE EDUCATION/TRAINING PROGRAM

## 2021-07-28 PROCEDURE — 97161 PT EVAL LOW COMPLEX 20 MIN: CPT

## 2021-07-28 PROCEDURE — 10002807 HB RX 258: Performed by: SURGERY

## 2021-07-28 PROCEDURE — 85025 COMPLETE CBC W/AUTO DIFF WBC: CPT | Performed by: STUDENT IN AN ORGANIZED HEALTH CARE EDUCATION/TRAINING PROGRAM

## 2021-07-28 PROCEDURE — 84100 ASSAY OF PHOSPHORUS: CPT | Performed by: STUDENT IN AN ORGANIZED HEALTH CARE EDUCATION/TRAINING PROGRAM

## 2021-07-28 PROCEDURE — 97535 SELF CARE MNGMENT TRAINING: CPT

## 2021-07-28 PROCEDURE — 83735 ASSAY OF MAGNESIUM: CPT | Performed by: STUDENT IN AN ORGANIZED HEALTH CARE EDUCATION/TRAINING PROGRAM

## 2021-07-28 PROCEDURE — 80048 BASIC METABOLIC PNL TOTAL CA: CPT | Performed by: STUDENT IN AN ORGANIZED HEALTH CARE EDUCATION/TRAINING PROGRAM

## 2021-07-28 PROCEDURE — 10002800 HB RX 250 W HCPCS: Performed by: STUDENT IN AN ORGANIZED HEALTH CARE EDUCATION/TRAINING PROGRAM

## 2021-07-28 RX ORDER — LANOLIN ALCOHOL/MO/W.PET/CERES
400 CREAM (GRAM) TOPICAL ONCE
Status: COMPLETED | OUTPATIENT
Start: 2021-07-28 | End: 2021-07-28

## 2021-07-28 RX ORDER — POTASSIUM CHLORIDE 20 MEQ/1
40 TABLET, EXTENDED RELEASE ORAL ONCE
Status: COMPLETED | OUTPATIENT
Start: 2021-07-28 | End: 2021-07-28

## 2021-07-28 RX ADMIN — GABAPENTIN 300 MG: 250 SOLUTION ORAL at 16:03

## 2021-07-28 RX ADMIN — CELECOXIB 200 MG: 200 CAPSULE ORAL at 12:23

## 2021-07-28 RX ADMIN — POTASSIUM CHLORIDE 40 MEQ: 1500 TABLET, EXTENDED RELEASE ORAL at 09:58

## 2021-07-28 RX ADMIN — Medication 400 MG: at 09:57

## 2021-07-28 RX ADMIN — KETOROLAC TROMETHAMINE 30 MG: 30 INJECTION, SOLUTION INTRAMUSCULAR; INTRAVENOUS at 06:13

## 2021-07-28 RX ADMIN — ACETAMINOPHEN 1000 MG: 650 SOLUTION ORAL at 23:27

## 2021-07-28 RX ADMIN — CELECOXIB 200 MG: 200 CAPSULE ORAL at 23:30

## 2021-07-28 RX ADMIN — ACETAMINOPHEN 1000 MG: 650 SOLUTION ORAL at 16:03

## 2021-07-28 RX ADMIN — GABAPENTIN 300 MG: 250 SOLUTION ORAL at 23:27

## 2021-07-28 RX ADMIN — Medication 400 MG: at 17:32

## 2021-07-28 RX ADMIN — GABAPENTIN 300 MG: 250 SOLUTION ORAL at 09:57

## 2021-07-28 RX ADMIN — ACETAMINOPHEN 1000 MG: 650 SOLUTION ORAL at 09:54

## 2021-07-28 RX ADMIN — SODIUM CHLORIDE, SODIUM LACTATE, POTASSIUM CHLORIDE, AND CALCIUM CHLORIDE: .6; .31; .03; .02 INJECTION, SOLUTION INTRAVENOUS at 17:27

## 2021-07-28 RX ADMIN — ENOXAPARIN SODIUM 40 MG: 100 INJECTION SUBCUTANEOUS at 21:35

## 2021-07-28 ASSESSMENT — COGNITIVE AND FUNCTIONAL STATUS - GENERAL
BASIC_MOBILITY_CONVERTED_SCORE: 57.68
BASIC_MOBILITY_RAW_SCORE: 24
HELP NEEDED DRESSING REGULAR LOWER BODY CLOTHING: A LITTLE
DAILY_ACTIVITY_RAW_SCORE: 22
HELP NEEDED FOR BATHING: A LITTLE
DAILY_ACTIVITY_CONVERTED_SCORE: 47.10

## 2021-07-28 ASSESSMENT — PAIN SCALES - GENERAL
PAINLEVEL_OUTOF10: 2
PAINLEVEL_OUTOF10: 4
PAINLEVEL_OUTOF10: 4
PAINLEVEL_OUTOF10: 0
PAINLEVEL_OUTOF10: 0

## 2021-07-28 ASSESSMENT — ACTIVITIES OF DAILY LIVING (ADL)
HOME_MANAGEMENT_TIME_ENTRY: 10
PRIOR_ADL: INDEPENDENT
PRIOR_ADL: INDEPENDENT

## 2021-07-28 ASSESSMENT — ENCOUNTER SYMPTOMS: PAIN SEVERITY NOW: 2

## 2021-07-29 VITALS
HEART RATE: 63 BPM | RESPIRATION RATE: 16 BRPM | TEMPERATURE: 97.5 F | OXYGEN SATURATION: 100 % | BODY MASS INDEX: 28.91 KG/M2 | WEIGHT: 163.14 LBS | SYSTOLIC BLOOD PRESSURE: 100 MMHG | DIASTOLIC BLOOD PRESSURE: 69 MMHG | HEIGHT: 63 IN

## 2021-07-29 LAB
ANION GAP SERPL CALC-SCNC: 12 MMOL/L (ref 10–20)
BASOPHILS # BLD: 0 K/MCL (ref 0–0.3)
BASOPHILS NFR BLD: 0 %
BUN SERPL-MCNC: 7 MG/DL (ref 6–20)
BUN/CREAT SERPL: 12 (ref 7–25)
CALCIUM SERPL-MCNC: 7.9 MG/DL (ref 8.4–10.2)
CHLORIDE SERPL-SCNC: 112 MMOL/L (ref 98–107)
CO2 SERPL-SCNC: 22 MMOL/L (ref 21–32)
CREAT SERPL-MCNC: 0.57 MG/DL (ref 0.51–0.95)
DEPRECATED RDW RBC: 43.2 FL (ref 39–50)
EOSINOPHIL # BLD: 0.1 K/MCL (ref 0–0.5)
EOSINOPHIL NFR BLD: 1 %
ERYTHROCYTE [DISTWIDTH] IN BLOOD: 13.2 % (ref 11–15)
FASTING DURATION TIME PATIENT: ABNORMAL H
GFR SERPLBLD BASED ON 1.73 SQ M-ARVRAT: >90 ML/MIN/1.73M2
GLUCOSE SERPL-MCNC: 102 MG/DL (ref 65–99)
HCT VFR BLD CALC: 34.4 % (ref 36–46.5)
HGB BLD-MCNC: 11.4 G/DL (ref 12–15.5)
IMM GRANULOCYTES # BLD AUTO: 0 K/MCL (ref 0–0.2)
IMM GRANULOCYTES # BLD: 0 %
LYMPHOCYTES # BLD: 2.8 K/MCL (ref 1–4.8)
LYMPHOCYTES NFR BLD: 36 %
MAGNESIUM SERPL-MCNC: 1.8 MG/DL (ref 1.7–2.4)
MCH RBC QN AUTO: 29.5 PG (ref 26–34)
MCHC RBC AUTO-ENTMCNC: 33.1 G/DL (ref 32–36.5)
MCV RBC AUTO: 89.1 FL (ref 78–100)
MONOCYTES # BLD: 0.6 K/MCL (ref 0.3–0.9)
MONOCYTES NFR BLD: 8 %
NEUTROPHILS # BLD: 4.1 K/MCL (ref 1.8–7.7)
NEUTROPHILS NFR BLD: 55 %
NRBC BLD MANUAL-RTO: 0 /100 WBC
PHOSPHATE SERPL-MCNC: 2.5 MG/DL (ref 2.4–4.7)
PLATELET # BLD AUTO: 237 K/MCL (ref 140–450)
POTASSIUM SERPL-SCNC: 3.7 MMOL/L (ref 3.4–5.1)
RBC # BLD: 3.86 MIL/MCL (ref 4–5.2)
SODIUM SERPL-SCNC: 142 MMOL/L (ref 135–145)
WBC # BLD: 7.7 K/MCL (ref 4.2–11)

## 2021-07-29 PROCEDURE — 10002803 HB RX 637: Performed by: STUDENT IN AN ORGANIZED HEALTH CARE EDUCATION/TRAINING PROGRAM

## 2021-07-29 PROCEDURE — 83735 ASSAY OF MAGNESIUM: CPT | Performed by: SURGERY

## 2021-07-29 PROCEDURE — 85025 COMPLETE CBC W/AUTO DIFF WBC: CPT | Performed by: STUDENT IN AN ORGANIZED HEALTH CARE EDUCATION/TRAINING PROGRAM

## 2021-07-29 PROCEDURE — 36415 COLL VENOUS BLD VENIPUNCTURE: CPT | Performed by: PHYSICIAN ASSISTANT

## 2021-07-29 PROCEDURE — 84100 ASSAY OF PHOSPHORUS: CPT | Performed by: STUDENT IN AN ORGANIZED HEALTH CARE EDUCATION/TRAINING PROGRAM

## 2021-07-29 PROCEDURE — 80048 BASIC METABOLIC PNL TOTAL CA: CPT | Performed by: PHYSICIAN ASSISTANT

## 2021-07-29 RX ADMIN — GABAPENTIN 300 MG: 250 SOLUTION ORAL at 06:48

## 2021-07-29 RX ADMIN — ACETAMINOPHEN 1000 MG: 650 SOLUTION ORAL at 06:48

## 2021-07-29 ASSESSMENT — PAIN SCALES - GENERAL
PAINLEVEL_OUTOF10: 2
PAINLEVEL_OUTOF10: 2

## 2021-08-01 LAB
ASR DISCLAIMER: NORMAL
CASE RPRT: NORMAL
CLINICAL INFO: NORMAL
PATH REPORT.FINAL DX SPEC: NORMAL
PATH REPORT.GROSS SPEC: NORMAL

## 2021-08-10 ENCOUNTER — OFFICE VISIT (OUTPATIENT)
Dept: SURGERY | Age: 36
End: 2021-08-10
Attending: CLINICAL NURSE SPECIALIST

## 2021-08-10 VITALS
DIASTOLIC BLOOD PRESSURE: 64 MMHG | OXYGEN SATURATION: 98 % | HEART RATE: 67 BPM | TEMPERATURE: 98.1 F | RESPIRATION RATE: 16 BRPM | SYSTOLIC BLOOD PRESSURE: 98 MMHG | HEIGHT: 62 IN | BODY MASS INDEX: 28.09 KG/M2 | WEIGHT: 152.67 LBS

## 2021-08-10 DIAGNOSIS — K57.32 DIVERTICULITIS OF COLON: Primary | ICD-10-CM

## 2021-08-10 PROCEDURE — 99211 OFF/OP EST MAY X REQ PHY/QHP: CPT | Performed by: CLINICAL NURSE SPECIALIST

## 2021-08-10 PROCEDURE — 99024 POSTOP FOLLOW-UP VISIT: CPT | Performed by: CLINICAL NURSE SPECIALIST

## 2021-08-10 ASSESSMENT — ENCOUNTER SYMPTOMS
FEVER: 0
APPETITE CHANGE: 1
ABDOMINAL PAIN: 0
VOMITING: 0
NAUSEA: 0
CONSTIPATION: 1
CHILLS: 0
ABDOMINAL DISTENTION: 0
RECTAL PAIN: 0
DIARRHEA: 0

## 2021-08-10 ASSESSMENT — PAIN SCALES - GENERAL: PAINLEVEL: 2

## 2021-08-30 ENCOUNTER — TELEPHONE (OUTPATIENT)
Dept: SURGERY | Age: 36
End: 2021-08-30

## 2021-08-30 DIAGNOSIS — K57.32 DIVERTICULITIS OF COLON: Primary | ICD-10-CM

## 2021-08-30 RX ORDER — HYDROCORTISONE ACETATE 25 MG/1
25 SUPPOSITORY RECTAL 2 TIMES DAILY PRN
Qty: 28 SUPPOSITORY | Refills: 0 | Status: SHIPPED | OUTPATIENT
Start: 2021-08-30 | End: 2021-11-27 | Stop reason: SDUPTHER

## 2021-09-13 ENCOUNTER — OFFICE VISIT (OUTPATIENT)
Dept: SURGERY | Age: 36
End: 2021-09-13
Attending: SURGERY

## 2021-09-13 VITALS
DIASTOLIC BLOOD PRESSURE: 54 MMHG | WEIGHT: 149 LBS | HEART RATE: 80 BPM | HEIGHT: 62 IN | RESPIRATION RATE: 16 BRPM | BODY MASS INDEX: 27.42 KG/M2 | SYSTOLIC BLOOD PRESSURE: 91 MMHG | TEMPERATURE: 97.7 F | OXYGEN SATURATION: 99 %

## 2021-09-13 DIAGNOSIS — K57.32 DIVERTICULITIS OF COLON: Primary | ICD-10-CM

## 2021-09-13 PROCEDURE — 99024 POSTOP FOLLOW-UP VISIT: CPT | Performed by: SURGERY

## 2021-09-13 PROCEDURE — 99211 OFF/OP EST MAY X REQ PHY/QHP: CPT | Performed by: SURGERY

## 2021-09-13 ASSESSMENT — ENCOUNTER SYMPTOMS
ANAL BLEEDING: 0
ENDOCRINE NEGATIVE: 1
CONSTIPATION: 0
BLOOD IN STOOL: 0
RECTAL PAIN: 0
ABDOMINAL PAIN: 0
DIARRHEA: 0
RESPIRATORY NEGATIVE: 1
NAUSEA: 0
VOMITING: 0
EYES NEGATIVE: 1
ABDOMINAL DISTENTION: 0
NEUROLOGICAL NEGATIVE: 1
CONSTITUTIONAL NEGATIVE: 1
PSYCHIATRIC NEGATIVE: 1

## 2021-09-13 ASSESSMENT — PAIN SCALES - GENERAL: PAINLEVEL: 2

## 2021-10-22 ENCOUNTER — LAB SERVICES (OUTPATIENT)
Dept: LAB | Age: 36
End: 2021-10-22

## 2021-10-22 ENCOUNTER — OFFICE VISIT (OUTPATIENT)
Dept: INTERNAL MEDICINE | Age: 36
End: 2021-10-22

## 2021-10-22 VITALS
TEMPERATURE: 97 F | WEIGHT: 154.25 LBS | RESPIRATION RATE: 18 BRPM | HEIGHT: 62 IN | OXYGEN SATURATION: 98 % | BODY MASS INDEX: 28.39 KG/M2 | HEART RATE: 68 BPM | SYSTOLIC BLOOD PRESSURE: 104 MMHG | DIASTOLIC BLOOD PRESSURE: 66 MMHG

## 2021-10-22 DIAGNOSIS — E55.9 VITAMIN D DEFICIENCY: ICD-10-CM

## 2021-10-22 DIAGNOSIS — L65.9 HAIR LOSS: ICD-10-CM

## 2021-10-22 DIAGNOSIS — R53.83 FATIGUE, UNSPECIFIED TYPE: ICD-10-CM

## 2021-10-22 DIAGNOSIS — Z23 NEEDS FLU SHOT: Primary | ICD-10-CM

## 2021-10-22 LAB
25(OH)D3+25(OH)D2 SERPL-MCNC: 22.5 NG/ML (ref 30–100)
BASOPHILS # BLD: 0 K/MCL (ref 0–0.3)
BASOPHILS NFR BLD: 0 %
DEPRECATED RDW RBC: 40.3 FL (ref 39–50)
EOSINOPHIL # BLD: 0.1 K/MCL (ref 0–0.5)
EOSINOPHIL NFR BLD: 1 %
ERYTHROCYTE [DISTWIDTH] IN BLOOD: 12.5 % (ref 11–15)
HCT VFR BLD CALC: 41.5 % (ref 36–46.5)
HGB BLD-MCNC: 14 G/DL (ref 12–15.5)
IMM GRANULOCYTES # BLD AUTO: 0 K/MCL (ref 0–0.2)
IMM GRANULOCYTES # BLD: 0 %
LYMPHOCYTES # BLD: 2.2 K/MCL (ref 1–4.8)
LYMPHOCYTES NFR BLD: 31 %
MCH RBC QN AUTO: 29.7 PG (ref 26–34)
MCHC RBC AUTO-ENTMCNC: 33.7 G/DL (ref 32–36.5)
MCV RBC AUTO: 87.9 FL (ref 78–100)
MONOCYTES # BLD: 0.6 K/MCL (ref 0.3–0.9)
MONOCYTES NFR BLD: 8 %
NEUTROPHILS # BLD: 4.2 K/MCL (ref 1.8–7.7)
NEUTROPHILS NFR BLD: 60 %
NRBC BLD MANUAL-RTO: 0 /100 WBC
PLATELET # BLD AUTO: 302 K/MCL (ref 140–450)
RBC # BLD: 4.72 MIL/MCL (ref 4–5.2)
TSH SERPL-ACNC: 1.76 MCUNITS/ML (ref 0.35–5)
VIT B12 SERPL-MCNC: 379 PG/ML (ref 211–911)
WBC # BLD: 7.1 K/MCL (ref 4.2–11)

## 2021-10-22 PROCEDURE — 90686 IIV4 VACC NO PRSV 0.5 ML IM: CPT

## 2021-10-22 PROCEDURE — 82607 VITAMIN B-12: CPT | Performed by: INTERNAL MEDICINE

## 2021-10-22 PROCEDURE — 84443 ASSAY THYROID STIM HORMONE: CPT | Performed by: INTERNAL MEDICINE

## 2021-10-22 PROCEDURE — 90471 IMMUNIZATION ADMIN: CPT

## 2021-10-22 PROCEDURE — 85025 COMPLETE CBC W/AUTO DIFF WBC: CPT | Performed by: INTERNAL MEDICINE

## 2021-10-22 PROCEDURE — 36415 COLL VENOUS BLD VENIPUNCTURE: CPT | Performed by: INTERNAL MEDICINE

## 2021-10-22 PROCEDURE — 82306 VITAMIN D 25 HYDROXY: CPT | Performed by: INTERNAL MEDICINE

## 2021-10-22 PROCEDURE — 99214 OFFICE O/P EST MOD 30 MIN: CPT | Performed by: INTERNAL MEDICINE

## 2021-10-22 ASSESSMENT — ENCOUNTER SYMPTOMS
BLOOD IN STOOL: 0
ABDOMINAL PAIN: 0
DIARRHEA: 0
CONSTIPATION: 0
ROS SKIN COMMENTS: HAIR LOSS
ANAL BLEEDING: 0

## 2021-10-22 ASSESSMENT — PATIENT HEALTH QUESTIONNAIRE - PHQ9
1. LITTLE INTEREST OR PLEASURE IN DOING THINGS: NOT AT ALL
CLINICAL INTERPRETATION OF PHQ2 SCORE: NO FURTHER SCREENING NEEDED
SUM OF ALL RESPONSES TO PHQ9 QUESTIONS 1 AND 2: 0
SUM OF ALL RESPONSES TO PHQ9 QUESTIONS 1 AND 2: 0
CLINICAL INTERPRETATION OF PHQ9 SCORE: NO FURTHER SCREENING NEEDED
SUM OF ALL RESPONSES TO PHQ9 QUESTIONS 1 AND 2: 0
2. FEELING DOWN, DEPRESSED OR HOPELESS: NOT AT ALL

## 2021-10-22 ASSESSMENT — PAIN SCALES - GENERAL: PAINLEVEL: 0

## 2021-11-27 DIAGNOSIS — K57.32 DIVERTICULITIS OF COLON: ICD-10-CM

## 2021-11-27 RX ORDER — HYDROCORTISONE ACETATE 25 MG
SUPPOSITORY, RECTAL RECTAL
Qty: 28 SUPPOSITORY | Refills: 0 | Status: SHIPPED | OUTPATIENT
Start: 2021-11-27

## 2022-03-04 ENCOUNTER — TELEPHONE (OUTPATIENT)
Dept: SCHEDULING | Age: 37
End: 2022-03-04

## 2022-05-06 ENCOUNTER — LAB SERVICES (OUTPATIENT)
Dept: LAB | Age: 37
End: 2022-05-06

## 2022-05-06 ENCOUNTER — OFFICE VISIT (OUTPATIENT)
Dept: FAMILY MEDICINE | Age: 37
End: 2022-05-06

## 2022-05-06 VITALS
RESPIRATION RATE: 19 BRPM | DIASTOLIC BLOOD PRESSURE: 70 MMHG | HEIGHT: 64 IN | BODY MASS INDEX: 29.96 KG/M2 | WEIGHT: 175.49 LBS | TEMPERATURE: 97.3 F | OXYGEN SATURATION: 98 % | HEART RATE: 86 BPM | SYSTOLIC BLOOD PRESSURE: 100 MMHG

## 2022-05-06 DIAGNOSIS — Z86.39 HISTORY OF VITAMIN D DEFICIENCY: ICD-10-CM

## 2022-05-06 DIAGNOSIS — H53.8 BLURRY VISION: ICD-10-CM

## 2022-05-06 DIAGNOSIS — Z87.19 HISTORY OF DIVERTICULITIS OF COLON: ICD-10-CM

## 2022-05-06 DIAGNOSIS — Z90.49 HISTORY OF PARTIAL COLECTOMY: ICD-10-CM

## 2022-05-06 DIAGNOSIS — R10.32 LLQ PAIN: Primary | ICD-10-CM

## 2022-05-06 DIAGNOSIS — K64.4 HEMORRHOIDAL SKIN TAG: ICD-10-CM

## 2022-05-06 PROBLEM — N83.299 COMPLEX OVARIAN CYST: Status: RESOLVED | Noted: 2020-02-10 | Resolved: 2022-05-06

## 2022-05-06 PROBLEM — K57.32 DIVERTICULITIS OF COLON: Status: RESOLVED | Noted: 2020-02-10 | Resolved: 2022-05-06

## 2022-05-06 PROBLEM — H61.21 IMPACTED CERUMEN OF RIGHT EAR: Status: RESOLVED | Noted: 2021-03-09 | Resolved: 2022-05-06

## 2022-05-06 LAB — 25(OH)D3+25(OH)D2 SERPL-MCNC: 21.3 NG/ML (ref 30–100)

## 2022-05-06 PROCEDURE — 99214 OFFICE O/P EST MOD 30 MIN: CPT | Performed by: FAMILY MEDICINE

## 2022-05-06 PROCEDURE — 36415 COLL VENOUS BLD VENIPUNCTURE: CPT | Performed by: INTERNAL MEDICINE

## 2022-05-06 PROCEDURE — 82306 VITAMIN D 25 HYDROXY: CPT | Performed by: INTERNAL MEDICINE

## 2022-05-06 ASSESSMENT — PATIENT HEALTH QUESTIONNAIRE - PHQ9
CLINICAL INTERPRETATION OF PHQ2 SCORE: NO FURTHER SCREENING NEEDED
SUM OF ALL RESPONSES TO PHQ9 QUESTIONS 1 AND 2: 0
1. LITTLE INTEREST OR PLEASURE IN DOING THINGS: NOT AT ALL
2. FEELING DOWN, DEPRESSED OR HOPELESS: NOT AT ALL
SUM OF ALL RESPONSES TO PHQ9 QUESTIONS 1 AND 2: 0

## 2022-05-06 ASSESSMENT — PAIN SCALES - GENERAL: PAINLEVEL: 0

## 2022-06-27 ENCOUNTER — APPOINTMENT (OUTPATIENT)
Dept: SURGERY | Age: 37
End: 2022-06-27
Attending: SURGERY

## 2022-11-11 ENCOUNTER — OFFICE VISIT (OUTPATIENT)
Dept: OBGYN | Age: 37
End: 2022-11-11

## 2022-11-11 VITALS
HEART RATE: 71 BPM | WEIGHT: 177.14 LBS | SYSTOLIC BLOOD PRESSURE: 100 MMHG | RESPIRATION RATE: 17 BRPM | BODY MASS INDEX: 30.24 KG/M2 | OXYGEN SATURATION: 98 % | TEMPERATURE: 98 F | HEIGHT: 64 IN | DIASTOLIC BLOOD PRESSURE: 62 MMHG

## 2022-11-11 DIAGNOSIS — K57.31 DIVERTICULOSIS OF COLON WITH HEMORRHAGE: ICD-10-CM

## 2022-11-11 DIAGNOSIS — Z01.419 WELL WOMAN EXAM WITH ROUTINE GYNECOLOGICAL EXAM: Primary | ICD-10-CM

## 2022-11-11 DIAGNOSIS — Z23 FLU VACCINE NEED: ICD-10-CM

## 2022-11-11 PROCEDURE — 88175 CYTOPATH C/V AUTO FLUID REDO: CPT | Performed by: INTERNAL MEDICINE

## 2022-11-11 PROCEDURE — 99395 PREV VISIT EST AGE 18-39: CPT | Performed by: OBSTETRICS & GYNECOLOGY

## 2022-11-11 RX ORDER — MULTIVIT-MIN/IRON/FOLIC ACID/K 18-600-40
CAPSULE ORAL DAILY
COMMUNITY

## 2022-11-11 ASSESSMENT — PAIN SCALES - GENERAL: PAINLEVEL: 0

## 2022-11-15 LAB — HOLD SPECIMEN: NORMAL

## 2022-11-18 LAB
CASE RPRT: NORMAL
CLINICAL INFO: NORMAL
CYTOLOGY CVX/VAG STUDY: NORMAL
PAP EDUCATIONAL NOTE: NORMAL
SPECIMEN ADEQUACY: NORMAL

## 2022-11-29 ENCOUNTER — E-ADVICE (OUTPATIENT)
Dept: OBGYN | Age: 37
End: 2022-11-29

## 2022-11-29 DIAGNOSIS — R39.9 UTI SYMPTOMS: Primary | ICD-10-CM

## 2022-12-01 RX ORDER — NITROFURANTOIN 25; 75 MG/1; MG/1
100 CAPSULE ORAL 2 TIMES DAILY
Qty: 6 CAPSULE | Refills: 0 | Status: SHIPPED | OUTPATIENT
Start: 2022-12-01 | End: 2022-12-04

## 2022-12-06 ENCOUNTER — OFFICE VISIT (OUTPATIENT)
Dept: INTERNAL MEDICINE | Age: 37
End: 2022-12-06

## 2022-12-06 VITALS
OXYGEN SATURATION: 100 % | WEIGHT: 172 LBS | HEIGHT: 64 IN | TEMPERATURE: 97.2 F | DIASTOLIC BLOOD PRESSURE: 70 MMHG | SYSTOLIC BLOOD PRESSURE: 97 MMHG | HEART RATE: 72 BPM | BODY MASS INDEX: 29.37 KG/M2

## 2022-12-06 DIAGNOSIS — Z00.00 PHYSICAL EXAM, ROUTINE: ICD-10-CM

## 2022-12-06 DIAGNOSIS — R10.30 LOWER ABDOMINAL PAIN: Primary | ICD-10-CM

## 2022-12-06 PROCEDURE — 99213 OFFICE O/P EST LOW 20 MIN: CPT | Performed by: INTERNAL MEDICINE

## 2022-12-06 ASSESSMENT — PATIENT HEALTH QUESTIONNAIRE - PHQ9
1. LITTLE INTEREST OR PLEASURE IN DOING THINGS: NOT AT ALL
SUM OF ALL RESPONSES TO PHQ9 QUESTIONS 1 AND 2: 0
2. FEELING DOWN, DEPRESSED OR HOPELESS: NOT AT ALL
SUM OF ALL RESPONSES TO PHQ9 QUESTIONS 1 AND 2: 0
CLINICAL INTERPRETATION OF PHQ2 SCORE: NO FURTHER SCREENING NEEDED

## 2022-12-06 ASSESSMENT — PAIN SCALES - GENERAL: PAINLEVEL: 0

## 2023-04-04 ENCOUNTER — OFFICE VISIT (OUTPATIENT)
Dept: INTERNAL MEDICINE | Age: 38
End: 2023-04-04

## 2023-04-04 ENCOUNTER — E-ADVICE (OUTPATIENT)
Dept: INTERNAL MEDICINE | Age: 38
End: 2023-04-04

## 2023-04-04 ENCOUNTER — LAB SERVICES (OUTPATIENT)
Dept: LAB | Age: 38
End: 2023-04-04

## 2023-04-04 VITALS
OXYGEN SATURATION: 98 % | DIASTOLIC BLOOD PRESSURE: 68 MMHG | SYSTOLIC BLOOD PRESSURE: 104 MMHG | BODY MASS INDEX: 31.68 KG/M2 | HEIGHT: 63 IN | TEMPERATURE: 96.7 F | WEIGHT: 178.79 LBS | HEART RATE: 85 BPM

## 2023-04-04 DIAGNOSIS — R10.30 LOWER ABDOMINAL PAIN: Primary | ICD-10-CM

## 2023-04-04 DIAGNOSIS — Z00.00 PHYSICAL EXAM, ROUTINE: ICD-10-CM

## 2023-04-04 DIAGNOSIS — Z00.00 PHYSICAL EXAM, ROUTINE: Primary | ICD-10-CM

## 2023-04-04 LAB
ALBUMIN SERPL-MCNC: 3.5 G/DL (ref 3.6–5.1)
ALBUMIN/GLOB SERPL: 0.8 {RATIO} (ref 1–2.4)
ALP SERPL-CCNC: 99 UNITS/L (ref 45–117)
ALT SERPL-CCNC: 23 UNITS/L
ANION GAP SERPL CALC-SCNC: 7 MMOL/L (ref 7–19)
APPEARANCE UR: CLEAR
AST SERPL-CCNC: 16 UNITS/L
BACTERIA #/AREA URNS HPF: ABNORMAL /HPF
BASOPHILS # BLD: 0 K/MCL (ref 0–0.3)
BASOPHILS NFR BLD: 0 %
BILIRUB SERPL-MCNC: 0.4 MG/DL (ref 0.2–1)
BILIRUB UR QL STRIP: NEGATIVE
BUN SERPL-MCNC: 9 MG/DL (ref 6–20)
BUN/CREAT SERPL: 17 (ref 7–25)
CALCIUM SERPL-MCNC: 8.5 MG/DL (ref 8.4–10.2)
CHLORIDE SERPL-SCNC: 108 MMOL/L (ref 97–110)
CHOLEST SERPL-MCNC: 187 MG/DL
CHOLEST/HDLC SERPL: 4.2 {RATIO}
CO2 SERPL-SCNC: 26 MMOL/L (ref 21–32)
COLOR UR: YELLOW
CREAT SERPL-MCNC: 0.54 MG/DL (ref 0.51–0.95)
DEPRECATED RDW RBC: 39.9 FL (ref 39–50)
EOSINOPHIL # BLD: 0.1 K/MCL (ref 0–0.5)
EOSINOPHIL NFR BLD: 2 %
ERYTHROCYTE [DISTWIDTH] IN BLOOD: 12.4 % (ref 11–15)
FASTING DURATION TIME PATIENT: ABNORMAL H
FASTING DURATION TIME PATIENT: ABNORMAL H
GFR SERPLBLD BASED ON 1.73 SQ M-ARVRAT: >90 ML/MIN
GLOBULIN SER-MCNC: 4.5 G/DL (ref 2–4)
GLUCOSE SERPL-MCNC: 101 MG/DL (ref 70–99)
GLUCOSE UR STRIP-MCNC: NEGATIVE MG/DL
HCT VFR BLD CALC: 41.3 % (ref 36–46.5)
HDLC SERPL-MCNC: 45 MG/DL
HGB BLD-MCNC: 14 G/DL (ref 12–15.5)
HGB UR QL STRIP: ABNORMAL
HYALINE CASTS #/AREA URNS LPF: ABNORMAL /LPF
IMM GRANULOCYTES # BLD AUTO: 0 K/MCL (ref 0–0.2)
IMM GRANULOCYTES # BLD: 0 %
KETONES UR STRIP-MCNC: NEGATIVE MG/DL
LDLC SERPL CALC-MCNC: 116 MG/DL
LEUKOCYTE ESTERASE UR QL STRIP: NEGATIVE
LYMPHOCYTES # BLD: 2.7 K/MCL (ref 1–4.8)
LYMPHOCYTES NFR BLD: 30 %
MCH RBC QN AUTO: 29.8 PG (ref 26–34)
MCHC RBC AUTO-ENTMCNC: 33.9 G/DL (ref 32–36.5)
MCV RBC AUTO: 87.9 FL (ref 78–100)
MONOCYTES # BLD: 0.7 K/MCL (ref 0.3–0.9)
MONOCYTES NFR BLD: 7 %
MUCOUS THREADS URNS QL MICRO: PRESENT
NEUTROPHILS # BLD: 5.4 K/MCL (ref 1.8–7.7)
NEUTROPHILS NFR BLD: 61 %
NITRITE UR QL STRIP: NEGATIVE
NONHDLC SERPL-MCNC: 142 MG/DL
NRBC BLD MANUAL-RTO: 0 /100 WBC
PH UR STRIP: 5.5 [PH] (ref 5–7)
PLATELET # BLD AUTO: 327 K/MCL (ref 140–450)
POTASSIUM SERPL-SCNC: 3.6 MMOL/L (ref 3.4–5.1)
PROT SERPL-MCNC: 8 G/DL (ref 6.4–8.2)
PROT UR STRIP-MCNC: NEGATIVE MG/DL
RBC # BLD: 4.7 MIL/MCL (ref 4–5.2)
RBC #/AREA URNS HPF: ABNORMAL /HPF
SODIUM SERPL-SCNC: 137 MMOL/L (ref 135–145)
SP GR UR STRIP: 1.03 (ref 1–1.03)
SQUAMOUS #/AREA URNS HPF: ABNORMAL /HPF
TRIGL SERPL-MCNC: 128 MG/DL
TSH SERPL-ACNC: 2.01 MCUNITS/ML (ref 0.35–5)
UROBILINOGEN UR STRIP-MCNC: 0.2 MG/DL
WBC # BLD: 8.9 K/MCL (ref 4.2–11)
WBC #/AREA URNS HPF: ABNORMAL /HPF

## 2023-04-04 PROCEDURE — 80053 COMPREHEN METABOLIC PANEL: CPT | Performed by: INTERNAL MEDICINE

## 2023-04-04 PROCEDURE — 80061 LIPID PANEL: CPT | Performed by: INTERNAL MEDICINE

## 2023-04-04 PROCEDURE — 84443 ASSAY THYROID STIM HORMONE: CPT | Performed by: INTERNAL MEDICINE

## 2023-04-04 PROCEDURE — 99213 OFFICE O/P EST LOW 20 MIN: CPT | Performed by: INTERNAL MEDICINE

## 2023-04-04 PROCEDURE — 85025 COMPLETE CBC W/AUTO DIFF WBC: CPT | Performed by: INTERNAL MEDICINE

## 2023-04-04 PROCEDURE — 83036 HEMOGLOBIN GLYCOSYLATED A1C: CPT | Performed by: INTERNAL MEDICINE

## 2023-04-04 PROCEDURE — 36415 COLL VENOUS BLD VENIPUNCTURE: CPT | Performed by: INTERNAL MEDICINE

## 2023-04-04 ASSESSMENT — PATIENT HEALTH QUESTIONNAIRE - PHQ9
SUM OF ALL RESPONSES TO PHQ9 QUESTIONS 1 AND 2: 0
2. FEELING DOWN, DEPRESSED OR HOPELESS: NOT AT ALL
CLINICAL INTERPRETATION OF PHQ2 SCORE: NO FURTHER SCREENING NEEDED
SUM OF ALL RESPONSES TO PHQ9 QUESTIONS 1 AND 2: 0
1. LITTLE INTEREST OR PLEASURE IN DOING THINGS: NOT AT ALL

## 2023-04-04 ASSESSMENT — PAIN SCALES - GENERAL: PAINLEVEL: 6

## 2023-04-05 ENCOUNTER — TELEPHONE (OUTPATIENT)
Dept: INTERNAL MEDICINE | Age: 38
End: 2023-04-05

## 2023-04-05 LAB — HBA1C MFR BLD: 5.3 % (ref 4.5–5.6)

## 2023-04-15 ENCOUNTER — LAB SERVICES (OUTPATIENT)
Dept: LAB | Age: 38
End: 2023-04-15

## 2023-04-15 ENCOUNTER — HOSPITAL ENCOUNTER (OUTPATIENT)
Dept: CT IMAGING | Age: 38
Discharge: HOME | End: 2023-04-15
Attending: INTERNAL MEDICINE

## 2023-04-15 DIAGNOSIS — R10.30 LOWER ABDOMINAL PAIN: ICD-10-CM

## 2023-04-15 LAB
HBA1C MFR BLD: 5.1 % (ref 4.5–5.6)
IGA SERPL-MCNC: 357 MG/DL (ref 70–400)
IGG SERPL-MCNC: 1620 MG/DL (ref 700–1600)
IGM SERPL-MCNC: 99 MG/DL (ref 40–230)

## 2023-04-15 PROCEDURE — G1004 CDSM NDSC: HCPCS

## 2023-04-15 PROCEDURE — 82784 ASSAY IGA/IGD/IGG/IGM EACH: CPT | Performed by: INTERNAL MEDICINE

## 2023-04-15 PROCEDURE — 74176 CT ABD & PELVIS W/O CONTRAST: CPT

## 2023-04-15 PROCEDURE — 36415 COLL VENOUS BLD VENIPUNCTURE: CPT | Performed by: INTERNAL MEDICINE

## 2023-04-15 PROCEDURE — 83036 HEMOGLOBIN GLYCOSYLATED A1C: CPT | Performed by: INTERNAL MEDICINE

## 2023-05-05 ENCOUNTER — TELEPHONE (OUTPATIENT)
Dept: SURGERY | Age: 38
End: 2023-05-05

## 2023-05-18 ENCOUNTER — APPOINTMENT (OUTPATIENT)
Dept: SURGERY | Age: 38
End: 2023-05-18
Attending: SURGERY

## 2023-10-02 ENCOUNTER — OFFICE VISIT (OUTPATIENT)
Dept: FAMILY MEDICINE | Age: 38
End: 2023-10-02

## 2023-10-02 VITALS
BODY MASS INDEX: 31.95 KG/M2 | OXYGEN SATURATION: 99 % | TEMPERATURE: 96.9 F | SYSTOLIC BLOOD PRESSURE: 95 MMHG | DIASTOLIC BLOOD PRESSURE: 68 MMHG | HEIGHT: 63 IN | WEIGHT: 180.34 LBS | HEART RATE: 82 BPM | RESPIRATION RATE: 16 BRPM

## 2023-10-02 DIAGNOSIS — R07.9 CHEST PAIN, UNSPECIFIED TYPE: ICD-10-CM

## 2023-10-02 DIAGNOSIS — R10.9 FLANK PAIN: Primary | ICD-10-CM

## 2023-10-02 DIAGNOSIS — K57.31 DIVERTICULOSIS OF COLON WITH HEMORRHAGE: ICD-10-CM

## 2023-10-02 DIAGNOSIS — M25.50 PAIN IN JOINT, MULTIPLE SITES: ICD-10-CM

## 2023-10-02 PROBLEM — Z90.710 S/P HYSTERECTOMY: Status: ACTIVE | Noted: 2023-10-02

## 2023-10-02 PROCEDURE — 87086 URINE CULTURE/COLONY COUNT: CPT | Performed by: CLINICAL MEDICAL LABORATORY

## 2023-10-02 PROCEDURE — 99214 OFFICE O/P EST MOD 30 MIN: CPT | Performed by: STUDENT IN AN ORGANIZED HEALTH CARE EDUCATION/TRAINING PROGRAM

## 2023-10-02 PROCEDURE — 81003 URINALYSIS AUTO W/O SCOPE: CPT | Performed by: CLINICAL MEDICAL LABORATORY

## 2023-10-02 ASSESSMENT — PATIENT HEALTH QUESTIONNAIRE - PHQ9
1. LITTLE INTEREST OR PLEASURE IN DOING THINGS: NOT AT ALL
SUM OF ALL RESPONSES TO PHQ9 QUESTIONS 1 AND 2: 0
2. FEELING DOWN, DEPRESSED OR HOPELESS: NOT AT ALL
CLINICAL INTERPRETATION OF PHQ2 SCORE: NO FURTHER SCREENING NEEDED
SUM OF ALL RESPONSES TO PHQ9 QUESTIONS 1 AND 2: 0

## 2023-10-03 ENCOUNTER — E-ADVICE (OUTPATIENT)
Dept: CARDIOLOGY | Age: 38
End: 2023-10-03

## 2023-10-03 ENCOUNTER — E-ADVICE (OUTPATIENT)
Dept: SURGERY | Age: 38
End: 2023-10-03

## 2023-10-03 LAB
APPEARANCE UR: CLEAR
BACTERIA #/AREA URNS HPF: ABNORMAL /HPF
BILIRUB UR QL STRIP: NEGATIVE
COLOR UR: ABNORMAL
GLUCOSE UR STRIP-MCNC: NEGATIVE MG/DL
HGB UR QL STRIP: NEGATIVE
HYALINE CASTS #/AREA URNS LPF: ABNORMAL /LPF
KETONES UR STRIP-MCNC: NEGATIVE MG/DL
LEUKOCYTE ESTERASE UR QL STRIP: NEGATIVE
MUCOUS THREADS URNS QL MICRO: PRESENT
NITRITE UR QL STRIP: NEGATIVE
PH UR STRIP: 6 [PH] (ref 5–7)
PROT UR STRIP-MCNC: NEGATIVE MG/DL
RBC #/AREA URNS HPF: ABNORMAL /HPF
SP GR UR STRIP: 1.01 (ref 1–1.03)
SQUAMOUS #/AREA URNS HPF: ABNORMAL /HPF
UROBILINOGEN UR STRIP-MCNC: 0.2 MG/DL
WBC #/AREA URNS HPF: ABNORMAL /HPF

## 2023-10-04 LAB — BACTERIA UR CULT: NORMAL

## 2023-10-05 ENCOUNTER — E-ADVICE (OUTPATIENT)
Dept: FAMILY MEDICINE | Age: 38
End: 2023-10-05

## 2023-10-05 DIAGNOSIS — Z86.39 HISTORY OF VITAMIN D DEFICIENCY: Primary | ICD-10-CM

## 2023-10-06 ENCOUNTER — LAB SERVICES (OUTPATIENT)
Dept: LAB | Age: 38
End: 2023-10-06

## 2023-10-06 DIAGNOSIS — Z86.39 HISTORY OF VITAMIN D DEFICIENCY: ICD-10-CM

## 2023-10-06 DIAGNOSIS — M25.50 PAIN IN JOINT, MULTIPLE SITES: ICD-10-CM

## 2023-10-06 DIAGNOSIS — R10.9 FLANK PAIN: ICD-10-CM

## 2023-10-06 LAB
25(OH)D3+25(OH)D2 SERPL-MCNC: 22.8 NG/ML (ref 30–100)
CRP SERPL-MCNC: 0.9 MG/DL
ERYTHROCYTE [SEDIMENTATION RATE] IN BLOOD BY WESTERGREN METHOD: 43 MM/HR (ref 0–20)
RHEUMATOID FACT SER NEPH-ACNC: <10 UNITS/ML

## 2023-10-06 PROCEDURE — 86038 ANTINUCLEAR ANTIBODIES: CPT | Performed by: CLINICAL MEDICAL LABORATORY

## 2023-10-06 PROCEDURE — 82306 VITAMIN D 25 HYDROXY: CPT | Performed by: CLINICAL MEDICAL LABORATORY

## 2023-10-06 PROCEDURE — 86235 NUCLEAR ANTIGEN ANTIBODY: CPT | Performed by: CLINICAL MEDICAL LABORATORY

## 2023-10-06 PROCEDURE — 86431 RHEUMATOID FACTOR QUANT: CPT | Performed by: CLINICAL MEDICAL LABORATORY

## 2023-10-06 PROCEDURE — 85652 RBC SED RATE AUTOMATED: CPT | Performed by: INTERNAL MEDICINE

## 2023-10-06 PROCEDURE — 86200 CCP ANTIBODY: CPT | Performed by: CLINICAL MEDICAL LABORATORY

## 2023-10-06 PROCEDURE — 86140 C-REACTIVE PROTEIN: CPT | Performed by: INTERNAL MEDICINE

## 2023-10-06 PROCEDURE — 36415 COLL VENOUS BLD VENIPUNCTURE: CPT | Performed by: STUDENT IN AN ORGANIZED HEALTH CARE EDUCATION/TRAINING PROGRAM

## 2023-10-06 PROCEDURE — 86039 ANTINUCLEAR ANTIBODIES (ANA): CPT | Performed by: CLINICAL MEDICAL LABORATORY

## 2023-10-06 PROCEDURE — 86225 DNA ANTIBODY NATIVE: CPT | Performed by: CLINICAL MEDICAL LABORATORY

## 2023-10-07 ENCOUNTER — HOSPITAL ENCOUNTER (OUTPATIENT)
Dept: GENERAL RADIOLOGY | Age: 38
Discharge: HOME | End: 2023-10-07

## 2023-10-07 DIAGNOSIS — R10.9 FLANK PAIN: ICD-10-CM

## 2023-10-07 DIAGNOSIS — M25.50 PAIN IN JOINT, MULTIPLE SITES: ICD-10-CM

## 2023-10-07 LAB — CCP AB SER IA-ACNC: 4 UNITS

## 2023-10-07 PROCEDURE — 72170 X-RAY EXAM OF PELVIS: CPT

## 2023-10-07 PROCEDURE — 73522 X-RAY EXAM HIPS BI 3-4 VIEWS: CPT

## 2023-10-07 PROCEDURE — 72100 X-RAY EXAM L-S SPINE 2/3 VWS: CPT

## 2023-10-09 DIAGNOSIS — R76.8 POSITIVE SM/RNP ANTIBODY: Primary | ICD-10-CM

## 2023-10-09 LAB
ANA SER QL IA: POSITIVE
CENTROMERE AB SER-ACNC: <0.2 AI
CHROMATIN AB SERPL-ACNC: <0.2 AI
DSDNA AB SER IA-ACNC: 1 IUNITS/ML
ENA JO1 IGG SER-ACNC: <0.2 AI
ENA RNP IGG SER IA-ACNC: 1.1 AI
ENA SCL70 IGG SER IA-ACNC: <0.2 AI
ENA SM IGG SER IA-ACNC: <0.2 AI
ENA SM+RNP IGG SER IA-ACNC: <0.2 AI
ENA SS-A IGG SER IA-ACNC: <0.2 AI
ENA SS-B IGG SER IA-ACNC: <0.2 AI
RIBOSOMAL P IGG SER-ACNC: <0.2 AI

## 2023-10-11 LAB
ANA PAT SER IF-IMP: ABNORMAL
ANA TITR SER IF: ABNORMAL {TITER}

## 2023-10-19 ENCOUNTER — APPOINTMENT (OUTPATIENT)
Dept: CARDIOLOGY | Age: 38
End: 2023-10-19

## 2023-10-24 ENCOUNTER — E-ADVICE (OUTPATIENT)
Dept: FAMILY MEDICINE | Age: 38
End: 2023-10-24

## 2023-10-24 ENCOUNTER — E-ADVICE (OUTPATIENT)
Dept: SURGERY | Age: 38
End: 2023-10-24

## 2023-10-30 ENCOUNTER — APPOINTMENT (OUTPATIENT)
Dept: FAMILY MEDICINE | Age: 38
End: 2023-10-30

## 2023-11-02 ENCOUNTER — E-ADVICE (OUTPATIENT)
Dept: FAMILY MEDICINE | Age: 38
End: 2023-11-02

## 2023-11-10 ENCOUNTER — EXTERNAL RECORD (OUTPATIENT)
Dept: HEALTH INFORMATION MANAGEMENT | Facility: OTHER | Age: 38
End: 2023-11-10

## 2023-11-20 ENCOUNTER — APPOINTMENT (OUTPATIENT)
Dept: FAMILY MEDICINE | Age: 38
End: 2023-11-20

## 2023-11-20 ENCOUNTER — CLINICAL ABSTRACT (OUTPATIENT)
Dept: HEALTH INFORMATION MANAGEMENT | Facility: OTHER | Age: 38
End: 2023-11-20

## 2023-12-04 ENCOUNTER — APPOINTMENT (OUTPATIENT)
Dept: FAMILY MEDICINE | Age: 38
End: 2023-12-04

## 2023-12-05 ENCOUNTER — APPOINTMENT (OUTPATIENT)
Dept: SURGERY | Age: 38
End: 2023-12-05

## 2023-12-05 VITALS
HEART RATE: 96 BPM | OXYGEN SATURATION: 99 % | DIASTOLIC BLOOD PRESSURE: 84 MMHG | HEIGHT: 63 IN | BODY MASS INDEX: 31.89 KG/M2 | SYSTOLIC BLOOD PRESSURE: 118 MMHG | WEIGHT: 180 LBS

## 2023-12-05 DIAGNOSIS — K57.32 DIVERTICULITIS OF COLON: Primary | ICD-10-CM

## 2023-12-05 PROCEDURE — 99204 OFFICE O/P NEW MOD 45 MIN: CPT | Performed by: STUDENT IN AN ORGANIZED HEALTH CARE EDUCATION/TRAINING PROGRAM

## 2023-12-05 ASSESSMENT — PAIN SCALES - GENERAL: PAINLEVEL: 0

## 2023-12-06 ASSESSMENT — ENCOUNTER SYMPTOMS
CONFUSION: 0
FATIGUE: 0
CHEST TIGHTNESS: 0
SLEEP DISTURBANCE: 0
ACTIVITY CHANGE: 0
DIZZINESS: 0
APPETITE CHANGE: 0
COUGH: 0
FEVER: 0
LIGHT-HEADEDNESS: 0
COLOR CHANGE: 0

## 2024-01-08 ENCOUNTER — APPOINTMENT (OUTPATIENT)
Dept: FAMILY MEDICINE | Age: 39
End: 2024-01-08

## 2024-01-08 VITALS
TEMPERATURE: 97 F | WEIGHT: 182.65 LBS | DIASTOLIC BLOOD PRESSURE: 70 MMHG | RESPIRATION RATE: 19 BRPM | BODY MASS INDEX: 32.36 KG/M2 | HEART RATE: 86 BPM | OXYGEN SATURATION: 98 % | HEIGHT: 63 IN | SYSTOLIC BLOOD PRESSURE: 107 MMHG

## 2024-01-08 DIAGNOSIS — M43.10 RETROLISTHESIS: Primary | ICD-10-CM

## 2024-01-08 DIAGNOSIS — Z01.419 ENCOUNTER FOR GYNECOLOGICAL EXAMINATION WITHOUT ABNORMAL FINDING: ICD-10-CM

## 2024-01-08 PROCEDURE — 99213 OFFICE O/P EST LOW 20 MIN: CPT | Performed by: STUDENT IN AN ORGANIZED HEALTH CARE EDUCATION/TRAINING PROGRAM

## 2024-01-08 ASSESSMENT — ENCOUNTER SYMPTOMS
SHORTNESS OF BREATH: 0
BACK PAIN: 1

## 2024-01-08 ASSESSMENT — PATIENT HEALTH QUESTIONNAIRE - PHQ9
SUM OF ALL RESPONSES TO PHQ9 QUESTIONS 1 AND 2: 0
1. LITTLE INTEREST OR PLEASURE IN DOING THINGS: NOT AT ALL
CLINICAL INTERPRETATION OF PHQ2 SCORE: NO FURTHER SCREENING NEEDED
SUM OF ALL RESPONSES TO PHQ9 QUESTIONS 1 AND 2: 0
SUM OF ALL RESPONSES TO PHQ9 QUESTIONS 1 AND 2: 0
CLINICAL INTERPRETATION OF PHQ2 SCORE: NO FURTHER SCREENING NEEDED
2. FEELING DOWN, DEPRESSED OR HOPELESS: NOT AT ALL
1. LITTLE INTEREST OR PLEASURE IN DOING THINGS: NOT AT ALL
2. FEELING DOWN, DEPRESSED OR HOPELESS: NOT AT ALL
SUM OF ALL RESPONSES TO PHQ9 QUESTIONS 1 AND 2: 0

## 2024-01-13 ENCOUNTER — APPOINTMENT (OUTPATIENT)
Dept: CT IMAGING | Facility: HOSPITAL | Age: 39
End: 2024-01-13
Attending: NURSE PRACTITIONER
Payer: COMMERCIAL

## 2024-01-13 ENCOUNTER — APPOINTMENT (OUTPATIENT)
Dept: GENERAL RADIOLOGY | Facility: HOSPITAL | Age: 39
End: 2024-01-13
Payer: COMMERCIAL

## 2024-01-13 ENCOUNTER — HOSPITAL ENCOUNTER (INPATIENT)
Facility: HOSPITAL | Age: 39
LOS: 2 days | Discharge: HOME | End: 2024-01-15
Attending: STUDENT IN AN ORGANIZED HEALTH CARE EDUCATION/TRAINING PROGRAM | Admitting: STUDENT IN AN ORGANIZED HEALTH CARE EDUCATION/TRAINING PROGRAM
Payer: COMMERCIAL

## 2024-01-13 DIAGNOSIS — S22.080A COMPRESSION FRACTURE OF T12 VERTEBRA, INITIAL ENCOUNTER (HCC): ICD-10-CM

## 2024-01-13 DIAGNOSIS — S32.020A COMPRESSION FRACTURE OF L2 VERTEBRA, INITIAL ENCOUNTER (HCC): ICD-10-CM

## 2024-01-13 DIAGNOSIS — S32.030A COMPRESSION FRACTURE OF L3 VERTEBRA, INITIAL ENCOUNTER (HCC): Primary | ICD-10-CM

## 2024-01-13 LAB
ANION GAP SERPL CALC-SCNC: 5 MMOL/L (ref 0–18)
BASOPHILS # BLD AUTO: 0.03 X10(3) UL (ref 0–0.2)
BASOPHILS NFR BLD AUTO: 0.2 %
BUN BLD-MCNC: 12 MG/DL (ref 9–23)
BUN/CREAT SERPL: 18.5 (ref 10–20)
CALCIUM BLD-MCNC: 9 MG/DL (ref 8.7–10.4)
CHLORIDE SERPL-SCNC: 108 MMOL/L (ref 98–112)
CO2 SERPL-SCNC: 26 MMOL/L (ref 21–32)
CREAT BLD-MCNC: 0.65 MG/DL
DEPRECATED RDW RBC AUTO: 37.9 FL (ref 35.1–46.3)
EGFRCR SERPLBLD CKD-EPI 2021: 117 ML/MIN/1.73M2 (ref 60–?)
EOSINOPHIL # BLD AUTO: 0.01 X10(3) UL (ref 0–0.7)
EOSINOPHIL NFR BLD AUTO: 0.1 %
ERYTHROCYTE [DISTWIDTH] IN BLOOD BY AUTOMATED COUNT: 12.3 % (ref 11–15)
GLUCOSE BLD-MCNC: 117 MG/DL (ref 70–99)
HCT VFR BLD AUTO: 37.3 %
HGB BLD-MCNC: 12.7 G/DL
IMM GRANULOCYTES # BLD AUTO: 0.12 X10(3) UL (ref 0–1)
IMM GRANULOCYTES NFR BLD: 0.7 %
LYMPHOCYTES # BLD AUTO: 1.54 X10(3) UL (ref 1–4)
LYMPHOCYTES NFR BLD AUTO: 9 %
MCH RBC QN AUTO: 29.1 PG (ref 26–34)
MCHC RBC AUTO-ENTMCNC: 34 G/DL (ref 31–37)
MCV RBC AUTO: 85.4 FL
MONOCYTES # BLD AUTO: 0.97 X10(3) UL (ref 0.1–1)
MONOCYTES NFR BLD AUTO: 5.7 %
NEUTROPHILS # BLD AUTO: 14.44 X10 (3) UL (ref 1.5–7.7)
NEUTROPHILS # BLD AUTO: 14.44 X10(3) UL (ref 1.5–7.7)
NEUTROPHILS NFR BLD AUTO: 84.3 %
OSMOLALITY SERPL CALC.SUM OF ELEC: 289 MOSM/KG (ref 275–295)
PLATELET # BLD AUTO: 286 10(3)UL (ref 150–450)
POTASSIUM SERPL-SCNC: 3.9 MMOL/L (ref 3.5–5.1)
RBC # BLD AUTO: 4.37 X10(6)UL
SODIUM SERPL-SCNC: 139 MMOL/L (ref 136–145)
WBC # BLD AUTO: 17.1 X10(3) UL (ref 4–11)

## 2024-01-13 PROCEDURE — 72100 X-RAY EXAM L-S SPINE 2/3 VWS: CPT

## 2024-01-13 PROCEDURE — 72131 CT LUMBAR SPINE W/O DYE: CPT | Performed by: NURSE PRACTITIONER

## 2024-01-13 PROCEDURE — 99222 1ST HOSP IP/OBS MODERATE 55: CPT | Performed by: HOSPITALIST

## 2024-01-13 RX ORDER — ZOLPIDEM TARTRATE 5 MG/1
5 TABLET ORAL NIGHTLY PRN
Status: DISCONTINUED | OUTPATIENT
Start: 2024-01-13 | End: 2024-01-15

## 2024-01-13 RX ORDER — ACETAMINOPHEN 325 MG/1
650 TABLET ORAL EVERY 6 HOURS PRN
Status: DISCONTINUED | OUTPATIENT
Start: 2024-01-13 | End: 2024-01-15

## 2024-01-13 RX ORDER — MORPHINE SULFATE 4 MG/ML
4 INJECTION, SOLUTION INTRAMUSCULAR; INTRAVENOUS EVERY 2 HOUR PRN
Status: DISCONTINUED | OUTPATIENT
Start: 2024-01-13 | End: 2024-01-15

## 2024-01-13 RX ORDER — MAGNESIUM HYDROXIDE/ALUMINUM HYDROXICE/SIMETHICONE 120; 1200; 1200 MG/30ML; MG/30ML; MG/30ML
30 SUSPENSION ORAL 4 TIMES DAILY PRN
Status: DISCONTINUED | OUTPATIENT
Start: 2024-01-13 | End: 2024-01-15

## 2024-01-13 RX ORDER — HEPARIN SODIUM 5000 [USP'U]/ML
5000 INJECTION, SOLUTION INTRAVENOUS; SUBCUTANEOUS EVERY 12 HOURS SCHEDULED
Status: DISCONTINUED | OUTPATIENT
Start: 2024-01-14 | End: 2024-01-15

## 2024-01-13 RX ORDER — HYDRALAZINE HYDROCHLORIDE 20 MG/ML
10 INJECTION INTRAMUSCULAR; INTRAVENOUS EVERY 4 HOURS PRN
Status: DISCONTINUED | OUTPATIENT
Start: 2024-01-13 | End: 2024-01-15

## 2024-01-13 RX ORDER — DOCUSATE SODIUM 100 MG/1
100 CAPSULE, LIQUID FILLED ORAL 2 TIMES DAILY
Status: DISCONTINUED | OUTPATIENT
Start: 2024-01-13 | End: 2024-01-15

## 2024-01-13 RX ORDER — ONDANSETRON 2 MG/ML
4 INJECTION INTRAMUSCULAR; INTRAVENOUS EVERY 6 HOURS PRN
Status: DISCONTINUED | OUTPATIENT
Start: 2024-01-13 | End: 2024-01-15

## 2024-01-13 RX ORDER — MORPHINE SULFATE 2 MG/ML
2 INJECTION, SOLUTION INTRAMUSCULAR; INTRAVENOUS EVERY 2 HOUR PRN
Status: DISCONTINUED | OUTPATIENT
Start: 2024-01-13 | End: 2024-01-15

## 2024-01-13 RX ORDER — PANTOPRAZOLE SODIUM 40 MG/1
40 TABLET, DELAYED RELEASE ORAL
Status: DISCONTINUED | OUTPATIENT
Start: 2024-01-14 | End: 2024-01-15

## 2024-01-13 RX ORDER — MORPHINE SULFATE 4 MG/ML
4 INJECTION, SOLUTION INTRAMUSCULAR; INTRAVENOUS ONCE
Status: COMPLETED | OUTPATIENT
Start: 2024-01-13 | End: 2024-01-13

## 2024-01-13 RX ORDER — HYDROCODONE BITARTRATE AND ACETAMINOPHEN 10; 325 MG/1; MG/1
1 TABLET ORAL EVERY 4 HOURS PRN
Status: DISCONTINUED | OUTPATIENT
Start: 2024-01-13 | End: 2024-01-15

## 2024-01-13 RX ORDER — HYDROCODONE BITARTRATE AND ACETAMINOPHEN 5; 325 MG/1; MG/1
1 TABLET ORAL ONCE
Status: COMPLETED | OUTPATIENT
Start: 2024-01-13 | End: 2024-01-13

## 2024-01-13 NOTE — ED INITIAL ASSESSMENT (HPI)
Lower back pain after falling off a sled. Pt denies numbness or tingling to lower extremities, also denies incontinence.

## 2024-01-14 ENCOUNTER — APPOINTMENT (OUTPATIENT)
Dept: GENERAL RADIOLOGY | Facility: HOSPITAL | Age: 39
End: 2024-01-14
Attending: NEUROLOGICAL SURGERY
Payer: COMMERCIAL

## 2024-01-14 LAB
ANION GAP SERPL CALC-SCNC: 7 MMOL/L (ref 0–18)
BASOPHILS # BLD AUTO: 0.02 X10(3) UL (ref 0–0.2)
BASOPHILS NFR BLD AUTO: 0.2 %
BUN BLD-MCNC: 10 MG/DL (ref 9–23)
BUN/CREAT SERPL: 16.7 (ref 10–20)
CALCIUM BLD-MCNC: 8.7 MG/DL (ref 8.7–10.4)
CHLORIDE SERPL-SCNC: 109 MMOL/L (ref 98–112)
CO2 SERPL-SCNC: 25 MMOL/L (ref 21–32)
CREAT BLD-MCNC: 0.6 MG/DL
DEPRECATED RDW RBC AUTO: 38.3 FL (ref 35.1–46.3)
EGFRCR SERPLBLD CKD-EPI 2021: 119 ML/MIN/1.73M2 (ref 60–?)
EOSINOPHIL # BLD AUTO: 0.08 X10(3) UL (ref 0–0.7)
EOSINOPHIL NFR BLD AUTO: 0.7 %
ERYTHROCYTE [DISTWIDTH] IN BLOOD BY AUTOMATED COUNT: 12.4 % (ref 11–15)
GLUCOSE BLD-MCNC: 110 MG/DL (ref 70–99)
HCT VFR BLD AUTO: 35.8 %
HGB BLD-MCNC: 12.5 G/DL
IMM GRANULOCYTES # BLD AUTO: 0.07 X10(3) UL (ref 0–1)
IMM GRANULOCYTES NFR BLD: 0.6 %
LYMPHOCYTES # BLD AUTO: 1.95 X10(3) UL (ref 1–4)
LYMPHOCYTES NFR BLD AUTO: 16.5 %
MCH RBC QN AUTO: 29.8 PG (ref 26–34)
MCHC RBC AUTO-ENTMCNC: 34.9 G/DL (ref 31–37)
MCV RBC AUTO: 85.4 FL
MONOCYTES # BLD AUTO: 0.83 X10(3) UL (ref 0.1–1)
MONOCYTES NFR BLD AUTO: 7 %
NEUTROPHILS # BLD AUTO: 8.89 X10 (3) UL (ref 1.5–7.7)
NEUTROPHILS # BLD AUTO: 8.89 X10(3) UL (ref 1.5–7.7)
NEUTROPHILS NFR BLD AUTO: 75 %
OSMOLALITY SERPL CALC.SUM OF ELEC: 292 MOSM/KG (ref 275–295)
PLATELET # BLD AUTO: 267 10(3)UL (ref 150–450)
POTASSIUM SERPL-SCNC: 3.7 MMOL/L (ref 3.5–5.1)
RBC # BLD AUTO: 4.19 X10(6)UL
SODIUM SERPL-SCNC: 141 MMOL/L (ref 136–145)
WBC # BLD AUTO: 11.8 X10(3) UL (ref 4–11)

## 2024-01-14 PROCEDURE — 99254 IP/OBS CNSLTJ NEW/EST MOD 60: CPT | Performed by: NEUROLOGICAL SURGERY

## 2024-01-14 PROCEDURE — 72100 X-RAY EXAM L-S SPINE 2/3 VWS: CPT | Performed by: NEUROLOGICAL SURGERY

## 2024-01-14 PROCEDURE — 99233 SBSQ HOSP IP/OBS HIGH 50: CPT | Performed by: HOSPITALIST

## 2024-01-14 NOTE — PHYSICAL THERAPY NOTE
PHYSICAL THERAPY EVALUATION - INPATIENT     Room Number: 404/404-A  Evaluation Date: 1/14/2024  Type of Evaluation: Initial   Physician Order: PT Eval and Treat    Presenting Problem: Compression fx of T12-L1, L2     Reason for Therapy: Mobility Dysfunction and Discharge Planning    PHYSICAL THERAPY ASSESSMENT     Patient is a 38 year old female admitted 1/13/2024 for acute T12-L1 and L2 fractures following a sledding accident.  Patient's current functional deficits include bed mobility, functional tranfers, balance, strength, endurance, gait and stair navigation, which are below the patient's pre-admission status.  Patient received supine in bed, agreeable to PT evaluation.   Reviewed spinal precautions with patient and patient's spouse. Pt reports understanding. Also discussed importance of frequent mobility and avoiding prolonged positioning.   Pt tolerates functional mobility fairly well. She requires support of walker for standing and ambulation tasks as well as railing and HHA for stair navigation. Needs increased time to complete tasks with slow hunter when ambulation. Frequent cues for relaxing shoulders/posture.   Overall pt requires CGA and walker for safe mobility. Recommending DC home with spouse to assist her and follow up with OP PT when medically appropriate.      Bed Mobility: Supine > sit with CGA - cues and demonstration for log roll to adhere to spinal precautions.   Transfers: Sit <> stand with SBA/CGA - cues for hand placement on walker.   Gait: Pt ambulates 200 ft with RW and SBA/CGA. Very slow gait speed and guarded posture.   Stairs: Pt navigates up/down 3 steps with one rail and HHA - cues for sequencing of step to pattern.     The patient's Approx Degree of Impairment: 46.58% has been calculated based on documentation in the [facility] '6 clicks' Inpatient Basic Mobility Short Form.  Research supports that patients with this level of impairment may benefit from DC home with OP PT (once  medically cleared).    Patient will benefit from continued IP PT services to address these deficits in preparation for discharge.    DISCHARGE RECOMMENDATIONS  PT Discharge Recommendations: 24 hour care/supervision;Home (OP PT when medically appropriate)    PLAN  PT Treatment Plan: Bed mobility;Body mechanics;Endurance;Energy conservation;Patient education;Family education;Gait training;Strengthening;Stoop training;Stair training;Transfer training;Balance training  Rehab Potential : Good  Frequency (Obs): 5x/week       PHYSICAL THERAPY MEDICAL/SOCIAL HISTORY     History related to current admission: Pt was sledding with 13 yo daughter when she crashed and landed on her back.      Problem List  Principal Problem:    Compression fracture of L3 vertebra, initial encounter ([facility])  Active Problems:    Compression fracture of L2 vertebra, initial encounter ([facility])    Compression fracture of T12 vertebra, initial encounter ([facility])      HOME SITUATION  Home Situation  Type of Home: House  Home Layout: One level (Laundry in basement, work has stairs)  Stairs to Enter : 1  Railing: No  Lives With: Spouse;Daughter (13 yo dtr)  Drives: Yes  Patient Owned Equipment: None  Patient Regularly Uses: Glasses     Prior Level of [place]: Independent/active, works at a desk job, drives.     SUBJECTIVE  \"I am retired from [employer]. I'm hoping I can work remote for the next week or so, my work has a lot of stairs.\"    PHYSICAL THERAPY EXAMINATION     OBJECTIVE  Precautions: Spine  Fall Risk: Standard fall risk    WEIGHT BEARING RESTRICTION  Weight Bearing Restriction: None                PAIN ASSESSMENT  Rating:  (Not rated)  Location: Back  Management Techniques: Body mechanics;Activity promotion;Repositioning;Breathing techniques    COGNITION  Overall Cognitive Status:  WFL - within functional limits    RANGE OF MOTION AND STRENGTH ASSESSMENT  Upper extremity ROM and strength are within functional limits   Lower extremity ROM is within  functional limits   Lower extremity strength is within functional limits     BALANCE  Static Sitting: Fair +  Dynamic Sitting: Fair  Static Standing: Fair -  Dynamic Standing: Fair -           AM-PAC '6-Clicks' INPATIENT SHORT FORM - BASIC MOBILITY  How much difficulty does the patient currently have...  Patient Difficulty: Turning over in bed (including adjusting bedclothes, sheets and blankets)?: A Little   Patient Difficulty: Sitting down on and standing up from a chair with arms (e.g., wheelchair, bedside commode, etc.): A Little   Patient Difficulty: Moving from lying on back to sitting on the side of the bed?: A Little   How much help from another person does the patient currently need...   Help from Another: Moving to and from a bed to a chair (including a wheelchair)?: A Little   Help from Another: Need to walk in hospital room?: A Little   Help from Another: Climbing 3-5 steps with a railing?: A Little     AM-PAC Score:  Raw Score: 18   Approx Degree of Impairment: 46.58%   Standardized Score (AM-PAC Scale): 43.63   CMS Modifier (G-Code): CK    FUNCTIONAL ABILITY STATUS  Functional Mobility/Gait Assessment  Gait Assistance: Supervision  Distance (ft): 200 ft  Assistive Device: Rolling walker  Pattern:  (Slow hunter, guarded posture)  Stairs: Stairs  How Many Stairs: 3  Device: 1 Rail;Hand held assist  Assist: Contact guard assist  Pattern: Ascend and Descend  Ascend and Descend : Step to      Exercise/Education Provided:  Bed mobility  Body mechanics  Energy conservation  Functional activity tolerated  Gait training  Posture  Strengthening  Transfer training  Spinal precautions , safety precautions, role of IP PT, importance of frequent mobility/avoiding prolonged positioning    Patient End of Session: Up in chair;With  staff;Needs met;Call light within reach;RN aware of session/findings;All patient questions and concerns addressed;Family present (pt with OT staff member)    CURRENT GOALS    Goals to be  met by: 1/21/24  Patient Goal Patient's self-stated goal is: return home   Goal #1 Patient is able to demonstrate supine - sit EOB @ level: modified independent     Goal #1   Current Status    Goal #2 Patient is able to demonstrate transfers Sit to/from Stand at assistance level: modified independent with walker - rolling     Goal #2  Current Status    Goal #3 Patient is able to ambulate 200 feet with assist device: walker - rolling at assistance level: modified independent   Goal #3   Current Status    Goal #4 Patient will negotiate 12 stairs/one curb w/ assistive device and supervision   Goal #4   Current Status    Goal #5 Patient to demonstrate independence with home activity/exercise instructions provided to patient in preparation for discharge.   Goal #5   Current Status    Goal #6    Goal #6  Current Status      Patient Evaluation Complexity Level:  History Low - no personal factors and/or co-morbidities   Examination of body systems Moderate - addressing a total of 3 or more elements   Clinical Presentation Low - Stable   Clinical Decision Making Low Complexity       Gait Training: 10 minutes  Therapeutic Activity: 15 minutes  PT Low Complex Eval x10 mins

## 2024-01-14 NOTE — PLAN OF CARE
Problem: Patient Centered Care  Goal: Patient preferences are identified and integrated in the patient's plan of care  Description: Interventions:  - What would you like us to know as we care for you?   - Provide timely, complete, and accurate information to patient/family  - Incorporate patient and family knowledge, values, beliefs, and cultural backgrounds into the planning and delivery of care  - Encourage patient/family to participate in care and decision-making at the level they choose  - Honor patient and family perspectives and choices  Outcome: Progressing     Problem: Patient/Family Goals  Goal: Patient/Family Long Term Goal  Description: Patient's Long Term Goal:     Interventions:  -   - See additional Care Plan goals for specific interventions  Outcome: Progressing  Goal: Patient/Family Short Term Goal  Description: Patient's Short Term Goal:     Interventions:   -   - See additional Care Plan goals for specific interventions  Outcome: Progressing     Problem: PAIN - ADULT  Goal: Verbalizes/displays adequate comfort level or patient's stated pain goal  Description: INTERVENTIONS:  - Encourage pt to monitor pain and request assistance  - Assess pain using appropriate pain scale  - Administer analgesics based on type and severity of pain and evaluate response  - Implement non-pharmacological measures as appropriate and evaluate response  - Consider cultural and social influences on pain and pain management  - Manage/alleviate anxiety  - Utilize distraction and/or relaxation techniques  - Monitor for opioid side effects  - Notify MD/LIP if interventions unsuccessful or patient reports new pain  - Anticipate increased pain with activity and pre-medicate as appropriate  Outcome: Progressing     Problem: SAFETY ADULT - FALL  Goal: Free from fall injury  Description: INTERVENTIONS:  - Assess pt frequently for physical needs  - Identify cognitive and physical deficits and behaviors that affect risk of  falls.  - [place] fall precautions as indicated by assessment.  - Educate pt/family on patient safety including physical limitations  - Instruct pt to call for assistance with activity based on assessment  - Modify environment to reduce risk of injury  - Provide assistive devices as appropriate  - Consider OT/PT consult to assist with strengthening/mobility  - Encourage toileting schedule  Outcome: Progressing     Problem: DISCHARGE PLANNING  Goal: Discharge to home or other facility with appropriate resources  Description: INTERVENTIONS:  - Identify barriers to discharge w/pt and caregiver  - Include patient/family/discharge partner in discharge planning  - Arrange for needed discharge resources and transportation as appropriate  - Identify discharge learning needs (meds, wound care, etc)  - Arrange for interpreters to assist at discharge as needed  - Consider post-discharge preferences of patient/family/discharge partner  - Complete POLST form as appropriate  - Assess patient's ability to be responsible for managing their own health  - Refer to Case Management Department for coordinating discharge planning if the patient needs post-hospital services based on physician/LIP order or complex needs related to functional status, cognitive ability or social support system  Outcome: Progressing     Pt alert and oriented. ED admit for compression fracture. Morphine for pain PRN. Tolerating diet. Purewick in place. Call light within reach. Bed in lowest and locked position. Neurosurgery on consult.

## 2024-01-14 NOTE — PROGRESS NOTES
[facility]  Hospitalist Progress Note     Stephanie Hanks Patient Status:  Inpatient    1985  38 year old [facility] [id number]   Location 404/404-A Attending Reji Holt MD   Hosp Day # 1 PCP Missy Barrett     Assessment & Plan:   ----------------------------------  Multiple compression fractures.  Due to trauma from sledding accident.  No neurologic deficits.  Neurosurgery consultation appreciated.  -Pain control  -Possible brace  -Neurosurgery following  -Upright x-rays    Urinary retention.  Voluntary.  Patient was able to void without difficulty after standing up.  -Expectant management    dvt prophylaxis: sc heparin  code status: full code  dispo: home upon medical clearance    I personally reviewed the available laboratories, imaging including x-rays. I discussed/will discuss the case with neurosurgery. I ordered laboratories, studies including BMP. I adjusted medications including morphine, Norco. Medical decision making high, risk is high.    >55min spent, >50% spent counseling and coordinating care in the form of educating pt/family and d/w consultants and staff. Most of the time spent discussing the above plan.       Subjective:   ----------------------------------  Pain slightly better than last night.  No radiating pain down the legs.  No bowel or bladder incontinence.  No saddle anesthesia.      Objective:   Chief Complaint:   Chief Complaint   Patient presents with    Back Pain     ----------------------------------  Temp:  [97.4 °F (36.3 °C)-98.9 °F (37.2 °C)] 98.9 °F (37.2 °C)  Pulse:  [] 109  Resp:  [16-20] 16  BP: (100-118)/(66-77) 105/77  SpO2:  [90 %-96 %] 90 %  Gen: A+Ox3.  No distress.   HEENT: NCAT, neck supple, no carotid bruit.  CV: RRR, S1S2, and intact distal pulses. No gallop, rub, murmur.  Pulm: Effort and breath sounds normal. No distress, wheezes, rales, rhonchi.  Abd: Soft, NTND, BS normal, no mass, no HSM, no rebound/guarding.   Neuro: Normal reflexes, CN.  Sensory/motor exams grossly normal deficit.   MS: No joint effusions.  No peripheral edema.  Skin: Skin is warm and dry. No rashes, erythema, diaphoresis.   Psych: Normal mood and affect. Calm, cooperative    Labs:  Lab Results   Component Value Date    HGB 12.5 01/14/2024    WBC 11.8 (H) 01/14/2024    .0 01/14/2024     01/14/2024    K 3.7 01/14/2024    CREATSERUM 0.60 01/14/2024          docusate sodium  100 mg Oral BID    heparin  5,000 Units Subcutaneous 2 times per day    pantoprazole  40 mg Oral QAM AC     ondansetron, morphINE **OR** morphINE, acetaminophen, hydrALAzine, HYDROcodone-acetaminophen, zolpidem, alum-mag hydroxide-simethicone

## 2024-01-14 NOTE — PLAN OF CARE
Patient walks with walker times 1 assist in the hallway after pain medication and was able to urinate.  Problem: PAIN - ADULT  Goal: Verbalizes/displays adequate comfort level or patient's stated pain goal  Description: INTERVENTIONS:  - Encourage pt to monitor pain and request assistance  - Assess pain using appropriate pain scale  - Administer analgesics based on type and severity of pain and evaluate response  - Implement non-pharmacological measures as appropriate and evaluate response  - Consider cultural and social influences on pain and pain management  - Manage/alleviate anxiety  - Utilize distraction and/or relaxation techniques  - Monitor for opioid side effects  - Notify MD/LIP if interventions unsuccessful or patient reports new pain  - Anticipate increased pain with activity and pre-medicate as appropriate  Outcome: Progressing     Problem: SAFETY ADULT - FALL  Goal: Free from fall injury  Description: INTERVENTIONS:  - Assess pt frequently for physical needs  - Identify cognitive and physical deficits and behaviors that affect risk of falls.  - [place] fall precautions as indicated by assessment.  - Educate pt/family on patient safety including physical limitations  - Instruct pt to call for assistance with activity based on assessment  - Modify environment to reduce risk of injury  - Provide assistive devices as appropriate  - Consider OT/PT consult to assist with strengthening/mobility  - Encourage toileting schedule  Outcome: Progressing
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
## 2024-01-14 NOTE — ED QUICK NOTES
Pt also complaining of leg pain stating \"legs were almost chopped off\". When pt asked about LOC, pt states \"I  for 2 seconds and was revived\". PT a/o x4, VSS, moving all limbs equally bilaterally.

## 2024-01-14 NOTE — IMAGING NOTE
Neurosurgery    Upright lumbar x-rays reviewed.  Good alignment.  Patient may follow-up in [facility] in 3-4 weeks with new upright lumbar x-rays.

## 2024-01-14 NOTE — PHYSICAL THERAPY NOTE
PT Orders and pt chart reviewed. Per EMR pt has acute T12-L1 and L2 fracture awaiting neurosurg consult. Will hold PT at this time pending POC. Thank you,    Verito Reese, PT  1/14/2024

## 2024-01-14 NOTE — CONSULTS
[facility]    Neurosurgery Consult  2024    Stephanie Hanks Patient Status:  Inpatient    1985 MRN [id number]   Location [facility] 4W/SW/SE Attending Reji Holt MD   Hosp Day # 1 PCP Missy Barrett     REASON FOR CONSULT:    Compression fractures    HISTORY OF PRESENT ILLNESS:  Stephanie Hanks is a(n) 38 year old female who presented to the emergency department yesterday after a sledding accident.  She was on a slide with her daughter.  They fell off the slide after hitting a small rise, and the patient struck her buttocks and back.    She reports pain in the lumbar and thoracolumbar region, particularly on the right.  The pain is intense.  She denies any saddle anesthesia, subjective leg weakness, numbness, or radiculopathy.  However, she notes that she has not urinated for about 18 hours.    REVIEW OF SYSTEMS:  The 12 point checklist was reviewed and was negative except: As noted in HPI    ALLERGIES:  No Known Allergies    MEDICATIONS:  No medications prior to admission.       HISTORY:  History reviewed. No pertinent past medical history.  History reviewed. No pertinent surgical history.  History reviewed. No pertinent family history.  Stephanie  reports that she has never smoked. She has never used smokeless tobacco.    PHYSICAL EXAMINATION:  Vital Signs:  /72 (BP Location: Left arm)   Pulse 95   Temp 98.6 °F (37 °C) (Temporal)   Resp 18   Wt 179 lb 1.6 oz (81.2 kg)   SpO2 93%   BMI 31.73 kg/m²     On examination, the patient has mild, diffuse tenderness to palpation over the thoracolumbar and lumbar region.  Strength is 5/5 throughout.  No hyperreflexia or long tract signs.    REVIEW OF STUDIES:  CT scan was personally reviewed.  This demonstrates mild compression fractures at T12, L1, and L2.  Alignment is generally well-maintained.      ASSESSMENT AND PLAN:  1.  Multiple compression fractures.  No indication for surgical intervention.  Ambulate with physical  therapy and nursing.  Bracing may be considered if pain is severe, but is not required.    Will obtain upright x-rays once the patient is ambulatory.    2.  Urinary retention.  This does not appear to be neurogenic.  The patient has been on flat bedrest overnight.  Ambulate patient now.  If she is unable to void, will obtain stat MRI of the lumbar spine.        1/14/2024  8:36 AM      This report was dictated using voice recognition technology.  Errors in syntax or recognition may occur, and should not be construed to change the meaning of a sentence.

## 2024-01-14 NOTE — OCCUPATIONAL THERAPY NOTE
OCCUPATIONAL THERAPY EVALUATION - INPATIENT     Room Number: 404/404-A  Evaluation Date: 1/14/2024  Type of Evaluation: Initial  Presenting Problem: back pain following sledding accident    Physician Order: IP Consult to Occupational Therapy  Reason for Therapy: ADL/IADL Dysfunction and Discharge Planning    OCCUPATIONAL THERAPY ASSESSMENT   Patient is a 38 year old female admitted 1/13/2024 for back pain following a sledding accident.  Pt cleared to participate in activity per neurosurgery note.     Pt received in bed; no family present however spouse arrived mid session.     Pt educated on the following, with written hand out provided: spine precautions, log roll, proper body mechanics with ADLs and functional mobility, posture and pain management, activity promotion within tolerance. Pt verbalizes understanding and offers no further questions.     Pt tolerates functional mobility managing BR and hallway distances well with increased time and the most difficult tasks demo to be transitional movements.     Pt requires much increased time to complete tasks- very guarded d/t pain- benefits from frequent cues to relax shoulders and ease      Functional Mobility  Rolling: supervision  Supine to sit: supervision with cues to carry over log roll technique  Functional Xfers (bed, chair, simulated car) with supervision  BR and hallway mobility: supervision/mod I using R/W. Pt demo very slow hunter, short steps with narrow URBANO- some improvement with cues.       ADLs  Grooming: mod I supported standing at sink  UE self care: Min A d/t pain   LE self care: Min A d/t pain    Pt and spouse verbalize understanding of demo techniques to manage LE self care, toileting while maintaining spine precautions in an effort to minimize pain.     The patient's Approx Degree of Impairment: 38.32% has been calculated based on documentation in the [facility] '6 clicks' Inpatient Daily Activity Short Form.  Research supports that patients with  this level of impairment have no further skilled OT needs. Anticipate pt is appropriate to return home with the support of her family     DISCHARGE RECOMMENDATIONS  OT Discharge Recommendations: Home    OCCUPATIONAL THERAPY MEDICAL/SOCIAL HISTORY   Problem List   Principal Problem:    Compression fracture of L3 vertebra, initial encounter ([facility])  Active Problems:    Compression fracture of L2 vertebra, initial encounter ([facility])    Compression fracture of T12 vertebra, initial encounter ([facility])    HOME SITUATION  Type of Home: House  Home Layout: One level (+basement with laundry)  Lives With: Spouse (13 y/o dtr)  Toilet and Equipment: Standard height toilet  Other Equipment: None  Occupation/Status: office work t [employer]  Hand Dominance: Right  Drives: Yes  Patient Regularly Uses: Glasses    Stairs in Home: 1 ENRIQUE, flight to basement  Assistive Device(s) Used: none     Prior Level of Ford: Pt lives in a 1 story house with basement laundry with her spouse (he leaves for work at 1330) and 13 y/o dtr. Pt as I without AD for al I/ADLs including office work, driving.       SUBJECTIVE  \"The pain is in my back and in my butt too\"    OCCUPATIONAL THERAPY EXAMINATION   OBJECTIVE  Precautions: Spine  Fall Risk: Standard fall risk    WEIGHT BEARING RESTRICTION     PAIN ASSESSMENT  Rating: -- (severe)  Location: low back  Management Techniques: Breathing techniques; Body mechanics; Relaxation    ACTIVITY TOLERANCE  Fair limited by pain    ACTIVITIES OF DAILY LIVING ASSESSMENT  AM-PAC ‘6-Clicks’ Inpatient Daily Activity Short Form  How much help from another person does the patient currently need…  -   Putting on and taking off regular lower body clothing?: A Little  -   Bathing (including washing, rinsing, drying)?: A Little  -   Toileting, which includes using toilet, bedpan or urinal? : A Little  -   Putting on and taking off regular upper body clothing?: A Little  -   Taking care of personal grooming such as  brushing teeth?: None  -   Eating meals?: None    AM-PAC Score:  Score: 20  Approx Degree of Impairment: 38.32%  Standardized Score (AM-PAC Scale): 42.03  CMS Modifier (G-Code): CJ    FUNCTIONAL TRANSFER ASSESSMENT  Sit to Stand: Edge of Bed; Chair  Edge of Bed: Supervision  Chair: Supervision  Simulated Car Transfer: Supervision    BED MOBILITY  Rolling: Supervision  Supine to Sit : Supervision  Sit to Supine (OT): Not Tested    EDUCATION PROVIDED  Patient : Role of Occupational Therapy; Plan of Care; Adaptive Equipment Recommendations; DME Recommendations; Functional Transfer Techniques; Fall Prevention; Posture/Positioning; Compensatory ADL Techniques; Proper Body Mechanics (written handot provided regarding spine precautions)  Patient's Response to Education: Verbalized Understanding; Returned Demonstration    Patient End of Session: Up in chair;Needs met;Call light within reach;RN aware of session/findings;All patient questions and concerns addressed (recommend nurse provide ice)    Patient Evaluation Complexity Level:   Occupational Profile/Medical History LOW - Brief history including review of medical or therapy records    Specific performance deficits impacting engagement in ADL/IADL LOW  1 - 3 performance deficits    Client Assessment/Performance Deficits LOW - No comorbidities nor modifications of tasks    Clinical Decision Making LOW - Analysis of occupational profile, problem-focused assessments, limited treatment options    Overall Complexity LOW     Self-Care Home Management: 20 minutes

## 2024-01-14 NOTE — H&P
[facility]    History & Physical    Stephanie Hanks Patient Status:  Inpatient    1985 MRN [id number]   Location [facility] 4W/SW/SE Attending Tracie Pitts MD   Hosp Day # 0 ADDIE Barrett     Date:  2024  Date of Admission:  2024    Chief Complaint:  Chief Complaint   Patient presents with    Back Pain       History of Present Illness:  Stephanie Hanks is a(n) 38 year old female, with a past medical history significant for diverticulitis presents with a complaint of sudden onset severe back pain.  Patient claims she was on a slide going down a hill when she had a bump, fell off the slide and hit her back noticed immediate severe pain in her lower back aggravated by minimal movement with no relieving factors.  Denies any radiation of the pain, denies any loss of bladder or bowel function.  No numbness or tingling in lower extremities.  She rates her pain as a 9 out of 10 at its worst.  CT scan of the lumbar spine indicates fracture of T12-L1 and L2    History:  Past medical history: Diverticulosis  Past surgical history: Hysterectomy, partial colectomy  Family history: No sick contacts  Social history: reports that she has never smoked. She has never used smokeless tobacco.    Allergies:  No Known Allergies    Home Medications:  None       Review of Systems:  Constitutional:  Weakness, Fatigue.  Eye:  Negative.  Ear/Nose/Mouth/Throat:  Negative.  Respiratory:  Negative  Cardiovascular: Negative  Gastrointestinal:  Negative.  Genitourinary:  Negative  Endocrine:  Negative.  Immunologic:  Negative.  Musculoskeletal: Lower back pain  Integumentary:  Negative.  Neurologic:  Negative.  Psychiatric:  Negative.  ROS reviewed as documented in chart    Physical Exam:  Temp:  [97.4 °F (36.3 °C)] 97.4 °F (36.3 °C)  Pulse:  [] 108  Resp:  [20] 20  BP: (104-118)/(70-76) 108/76  SpO2:  [96 %] 96 %    General:  Alert and oriented.  Diffuse skin problem:  None.  Eye:  Pupils  are equal, round and reactive to light, extraocular movements are intact, Normal conjunctiva.  HENT:  Normocephalic, oral mucosa is moist.  Head:  Normocephalic, atraumatic.  Neck:  Supple, non-tender, no carotid bruit, no jugular venous distention, no lymphadenopathy, no thyromegaly.  Respiratory:  Lungs are clear to auscultation, respirations are non-labored, breath sounds are equal, symmetrical chest wall expansion.  Cardiovascular:  Normal rate, regular rhythm, no murmur, no edema.  Gastrointestinal:  Soft, non-tender, non-distended, normal bowel sounds, no organomegaly.  Lymphatics:  No lymphadenopathy neck, axilla, groin.  Musculoskeletal: Decreased range of motion lower back secondary to pain  Feet:  Normal pulses.  Neurologic:  Alert, oriented, no focal deficits, cranial nerves II-XII are grossly intact.  Cognition and Speech:  Oriented, speech clear and coherent.  Psychiatric:  Cooperative, appropriate mood & affect.      Laboratory Data:   Lab Results   Component Value Date    WBC 17.1 01/13/2024    HGB 12.7 01/13/2024    HCT 37.3 01/13/2024    .0 01/13/2024    CREATSERUM 0.65 01/13/2024    BUN 12 01/13/2024     01/13/2024    K 3.9 01/13/2024     01/13/2024    CO2 26.0 01/13/2024     01/13/2024    CA 9.0 01/13/2024       Imaging:  CT SPINE LUMBAR (CPT=72131)    Result Date: 1/13/2024  CONCLUSION:   Acute minimal compression deformities of the superior endplates of T12, L1, and L2.  Minimally displaced fracture of the spinous process of T12.   Colonic diverticulosis.   Dictated by (CST): Tushar Colmenares MD on 1/13/2024 at 9:16 PM     Finalized by (CST): Tushar Colmenares MD on 1/13/2024 at 9:22 PM          XR LUMBAR SPINE (MIN 2 VIEWS) (CPT=72100)    Result Date: 1/13/2024  CONCLUSION:   Question mild compression deformities of the superior endplates of L1 and L2.  Question lucency through the spinous process of T12.  Recommend further evaluation with CT of the lumbar spine.    Dictated  by (CST): Tushar Colmenares MD on 1/13/2024 at 7:02 PM     Finalized by (CST): Tushar Colmenares MD on 1/13/2024 at 7:05 PM            Assessment and Plan:    Compression fracture T12, L1 and L2  Intractable pain at this time, will use morphine as needed for pain, PT/OT to evaluate and treat.  Neurosurgery has been consulted will require back brace.    Diverticulosis  Use stool softeners in view of current opiates for pain control    Prophylaxis  Subcutaneous heparin    CODE STATUS  Full    Primary care physician  Missy Barrett    60 minutes spent on this admission - examining patient, obtaining history, reviewing previous medical records, going over test results/imaging and discussing plan of care. All questions answered.     Disposition  Clinical course will dictate outcome      RAJEEV CUENCA MD  1/13/2024  11:12 PM

## 2024-01-15 VITALS
HEART RATE: 96 BPM | DIASTOLIC BLOOD PRESSURE: 60 MMHG | OXYGEN SATURATION: 91 % | WEIGHT: 179.13 LBS | TEMPERATURE: 99 F | RESPIRATION RATE: 15 BRPM | BODY MASS INDEX: 32 KG/M2 | SYSTOLIC BLOOD PRESSURE: 95 MMHG

## 2024-01-15 PROCEDURE — 99239 HOSP IP/OBS DSCHRG MGMT >30: CPT | Performed by: HOSPITALIST

## 2024-01-15 RX ORDER — PSEUDOEPHEDRINE HCL 30 MG
100 TABLET ORAL 2 TIMES DAILY
Status: SHIPPED | COMMUNITY
Start: 2024-01-15

## 2024-01-15 RX ORDER — HYDROCODONE BITARTRATE AND ACETAMINOPHEN 10; 325 MG/1; MG/1
1 TABLET ORAL EVERY 4 HOURS PRN
Qty: 30 TABLET | Refills: 0 | Status: SHIPPED | OUTPATIENT
Start: 2024-01-15

## 2024-01-15 RX ORDER — POLYETHYLENE GLYCOL 3350 17 G/17G
17 POWDER, FOR SOLUTION ORAL DAILY
Status: DISCONTINUED | OUTPATIENT
Start: 2024-01-15 | End: 2024-01-15

## 2024-01-15 NOTE — PAYOR COMM NOTE
--------------  ADMISSION REVIEW     Payor: [employer]  Subscriber #:  [id number]  Authorization Number: [id number]    Admit date: 1/13/24  Admit time: 10:59 PM       REVIEW DOCUMENTATION:    Lower back pain after falling off a sled     Patient Seen in: [facility] Emergency Department    History     Chief Complaint   Patient presents with    Back Pain     Stated Complaint: back pain, sledding accident    Subjective:   37yo/f w hx of diverticulitis, s/p partial hysterectomy and colectomy reports to the ED w c/o right lower back pain. She was sledding, hit a bump and fell off and struck her low back. Pain is sharp and constant. No loss or bowel or bladder. No numbness, tingling. Ambulatory on scene. Cold pack improves. No loss of bowel/bladder.     Positive for stated complaint: back pain, sledding accident    Physical Exam     ED Triage Vitals [01/13/24 1741]   /70   Pulse 101   Resp 20   Temp 97.4 °F (36.3 °C)   Temp src Oral   SpO2 96 %   O2 Device      Current:/70   Pulse 101   Temp 97.4 °F (36.3 °C) (Oral)   Resp 20   SpO2 96%       Musculoskeletal:         General: Tenderness present. No deformity. Normal range of motion.      Cervical back: Normal range of motion and neck supple.      Comments: Ttp right lower back, no crepitus, no edema       Impression  CONCLUSION:     Acute minimal compression deformities of the superior endplates of T12, L1, and L2.     Minimally displaced fracture of the spinous process of T12.        Colonic diverticulosis.       Dictated by (CST): Tushar Colmenares MD on 1/13/2024 at 9:16 PM      Finalized by (CST): Tushar Colmenares MD on 1/13/2024 at 9:22 PM       Disposition and Plan     Clinical Impression:  1. Compression fracture of L3 vertebra, initial encounter (HCC)    2. Compression fracture of L2 vertebra, initial encounter (HCC)    3. Compression fracture of T12 vertebra, initial encounter (HCC)         Disposition:  Admit  1/13/2024 10:09 pm         [facility]    History & Physical    Stephanie Hanks Patient Status:  Inpatient    1985 MRN [id number]   Location [facility] 4W/SW/SE Attending Tracie Pitts MD   Hosp Day # 0 ADDIE Garzacarly Arnold     Date:  2024  Date of Admission:  2024    Chief Complaint:  Chief Complaint   Patient presents with    Back Pain       History of Present Illness:  Stephanie Hanks is a(n) 38 year old female, with a past medical history significant for diverticulitis presents with a complaint of sudden onset severe back pain.  Patient claims she was on a slide going down a hill when she had a bump, fell off the slide and hit her back noticed immediate severe pain in her lower back aggravated by minimal movement with no relieving factors.  Denies any radiation of the pain, denies any loss of bladder or bowel function.  No numbness or tingling in lower extremities.  She rates her pain as a 9 out of 10 at its worst.  CT scan of the lumbar spine indicates fracture of T12-L1 and L2    History:  Past medical history: Diverticulosis  Past surgical history: Hysterectomy, partial colectomy  Family history: No sick contacts  Social history: reports that she has never smoked. She has never used smokeless tobacco.    Allergies:  No Known Allergies    Home Medications:  None       Review of Systems:  Constitutional:  Weakness, Fatigue.  Eye:  Negative.  Ear/Nose/Mouth/Throat:  Negative.  Respiratory:  Negative  Cardiovascular: Negative  Gastrointestinal:  Negative.  Genitourinary:  Negative  Endocrine:  Negative.  Immunologic:  Negative.  Musculoskeletal: Lower back pain  Integumentary:  Negative.  Neurologic:  Negative.  Psychiatric:  Negative.  ROS reviewed as documented in chart    Physical Exam:  Temp:  [97.4 °F (36.3 °C)] 97.4 °F (36.3 °C)  Pulse:  [] 108  Resp:  [20] 20  BP: (104-118)/(70-76) 108/76  SpO2:  [96 %] 96 %    General:  Alert and oriented.  Diffuse skin problem:  None.  Eye:  Pupils are  equal, round and reactive to light, extraocular movements are intact, Normal conjunctiva.  HENT:  Normocephalic, oral mucosa is moist.  Head:  Normocephalic, atraumatic.  Neck:  Supple, non-tender, no carotid bruit, no jugular venous distention, no lymphadenopathy, no thyromegaly.  Respiratory:  Lungs are clear to auscultation, respirations are non-labored, breath sounds are equal, symmetrical chest wall expansion.  Cardiovascular:  Normal rate, regular rhythm, no murmur, no edema.  Gastrointestinal:  Soft, non-tender, non-distended, normal bowel sounds, no organomegaly.  Lymphatics:  No lymphadenopathy neck, axilla, groin.  Musculoskeletal: Decreased range of motion lower back secondary to pain  Feet:  Normal pulses.  Neurologic:  Alert, oriented, no focal deficits, cranial nerves II-XII are grossly intact.  Cognition and Speech:  Oriented, speech clear and coherent.  Psychiatric:  Cooperative, appropriate mood & affect.      Imaging:  CT SPINE LUMBAR (CPT=72131)    Result Date: 1/13/2024  CONCLUSION:   Acute minimal compression deformities of the superior endplates of T12, L1, and L2.  Minimally displaced fracture of the spinous process of T12.   Colonic diverticulosis.   Dictated by (CST): Tushar Colmenares MD on 1/13/2024 at 9:16 PM     Finalized by (CST): Tushar Colmenares MD on 1/13/2024 at 9:22 PM          XR LUMBAR SPINE (MIN 2 VIEWS) (CPT=72100)    Result Date: 1/13/2024  CONCLUSION:   Question mild compression deformities of the superior endplates of L1 and L2.  Question lucency through the spinous process of T12.  Recommend further evaluation with CT of the lumbar spine.    Dictated by (CST): Tushar Colmenares MD on 1/13/2024 at 7:02 PM     Finalized by (CST): Tushar Colmenares MD on 1/13/2024 at 7:05 PM            Assessment and Plan:    Compression fracture T12, L1 and L2  Intractable pain at this time, will use morphine as needed for pain, PT/OT to evaluate and treat.  Neurosurgery has been consulted will require back  brace.    Diverticulosis  Use stool softeners in view of current opiates for pain control    Prophylaxis  Subcutaneous heparin    CODE STATUS  Full    Primary care physician  Missy Barrett    60 minutes spent on this admission - examining patient, obtaining history, reviewing previous medical records, going over test results/imaging and discussing plan of care. All questions answered.     Disposition  Clinical course will dictate outcome      RAJEEV CUENCA MD  1/13/2024  11:12 PM    1/14/24 Neurosurgery     REASON FOR CONSULT:    Compression fractures     HISTORY OF PRESENT ILLNESS:  Stephanie Hanks is a(n) 38 year old female who presented to the emergency department yesterday after a sledding accident.  She was on a slide with her daughter.  They fell off the slide after hitting a small rise, and the patient struck her buttocks and back.     She reports pain in the lumbar and thoracolumbar region, particularly on the right.  The pain is intense.  She denies any saddle anesthesia, subjective leg weakness, numbness, or radiculopathy.  However, she notes that she has not urinated for about 18 hours.    On examination, the patient has mild, diffuse tenderness to palpation over the thoracolumbar and lumbar region.  Strength is 5/5 throughout.  No hyperreflexia or long tract signs.     REVIEW OF STUDIES:  CT scan was personally reviewed.  This demonstrates mild compression fractures at T12, L1, and L2.  Alignment is generally well-maintained.        ASSESSMENT AND PLAN:  1.  Multiple compression fractures.  No indication for surgical intervention.  Ambulate with physical therapy and nursing.  Bracing may be considered if pain is severe, but is not required.     Will obtain upright x-rays once the patient is ambulatory.     2.  Urinary retention.  This does not appear to be neurogenic.  The patient has been on flat bedrest overnight.  Ambulate patient now.  If she is unable to void, will obtain stat MRI of the lumbar  spine.      [facility]      .  Due to trauma from sledding accident.  No neurologic deficits.  Neurosurgery consultation appreciated.  -Pain control  -Possible brace  -Neurosurgery following  -Upright x-rays     Urinary retention.  Voluntary.  Patient was able to void without difficulty after standing up.  -Expectant management         01/14/24 1228 01/14/24 1328 01/14/24 1625   Pain Assessment: Ongoing & Relief (q4h & relief)   Pain Assessment Tool 0-10 0-10 0-10   0-10 Scale \"Severe\" \"Moderate\" 9 (severe pain)   Pain Orientation  --  Mid  --    Pain Location  --  Back  --    Pain Intervention(s) Medication  --  Medication             1/15/24    Discharged: Today 1326     MEDICATIONS ADMINISTERED IN LAST 1 DAY:  docusate sodium (Colace) cap 100 mg       Date Action Dose Route User    Discharged on 1/15/2024    1/15/2024 0938 Given 100 mg Oral Becki Braun RN    1/14/2024 2032 Given 100 mg Oral Mai Dennis RN          heparin (Porcine) 5000 UNIT/ML injection 5,000 Units       Date Action Dose Route User    Discharged on 1/15/2024    1/15/2024 0938 Given 5,000 Units Subcutaneous (Left Lower Abdomen) Becki Braun RN    1/14/2024 2032 Given 5,000 Units Subcutaneous (Right Lower Abdomen) Mai Dennis RN          HYDROcodone-acetaminophen (Norco)  MG per tab 1 tablet       Date Action Dose Route User    Discharged on 1/15/2024    1/15/2024 0938 Given 1 tablet Oral Becki Braun RN    1/15/2024 0538 Given 1 tablet Oral Mai Dennis RN    1/15/2024 0055 Given 1 tablet Oral Mai Dennis RN    1/14/2024 2036 Given 1 tablet Oral Mai Dennis RN    1/14/2024 1625 Given 1 tablet Oral Lelia Alvarenga RN          pantoprazole (Protonix) DR tab 40 mg       Date Action Dose Route User    Discharged on 1/15/2024    1/15/2024 0538 Given 40 mg Oral Mai Dennis RN          polyethylene glycol (PEG 3350) (Miralax) 17 g oral packet 17 g       Date Action Dose Route User     Discharged on 1/15/2024    1/15/2024 0945 Given 17 g Oral Becki Braun RN                Vitals (last day) before discharge       Date/Time Temp Pulse Resp BP SpO2 Weight O2 Device O2 Flow Rate (L/min) [facility]    01/15/24 0750 99.1 °F (37.3 °C) 96 15 95/60 91 % -- None (Room air) --     01/15/24 0435 97.9 °F (36.6 °C) 89 16 108/73 92 % -- None (Room air) --     01/14/24 2030 99.2 °F (37.3 °C) 87 16 96/66 95 % -- None (Room air) --     01/14/24 1633 98.6 °F (37 °C) 91 15 93/59 90 % -- None (Room air) --     01/14/24 0846 98.9 °F (37.2 °C) 109 16 105/77 90 % -- None (Room air) --     01/14/24 0505 98.6 °F (37 °C) 95 18 108/72 93 % -- None (Room air) --

## 2024-01-15 NOTE — PHYSICAL THERAPY NOTE
PHYSICAL THERAPY TREATMENT NOTE - INPATIENT     Room Number: 404/404-A       Presenting Problem: Compression fx of T12-L1, L2    Problem List  Principal Problem:    Compression fracture of L3 vertebra, initial encounter (formerly [facility])  Active Problems:    Compression fracture of L2 vertebra, initial encounter (formerly [facility])    Compression fracture of T12 vertebra, initial encounter (formerly [facility])      PHYSICAL THERAPY ASSESSMENT   Chart reviewed. RN  approved participation in physical therapy.  PPE worn by therapist: mask, gloves, and goggles. Patient was wearing a mask during session. Patient presented in bed with 6/10 pain. Patient with good  progress towards goals during this session. Education provided on Physical therapy plan of care and physiological benefits of out of bed mobility. Patient with good carryover.     Bed mobility: Min assist  Transfers: Min assist  Gait Assistance: Contact guard assist  Distance (ft): 2 x 100  Assistive Device: Rolling walker  Pattern: Shuffle          Pt seen daily.Min a for bed mobility and transfer.EOB sitting balance activity with emphasis on core stabilization.Pt amb 2 x 100 ft with RW and  CGA.Navigated 4 stairs with CGA.There ex.Spinal precautions reviewed;education. Patient was left in bedside chair at end of session with all needs in reach. The patient's Approx Degree of Impairment: 46.58% has been calculated based on documentation in the [facility] '6 clicks' Inpatient Basic Mobility Short Form.  Research supports that patients with this level of impairment may benefit from 24 hour care/supervision.  RN aware of patient status post session.    DISCHARGE RECOMMENDATIONS  PT Discharge Recommendations: 24 hour care/supervision     PLAN  PT Treatment Plan: Endurance    SUBJECTIVE  Pt reports being ready  for PT RX    OBJECTIVE  Precautions: Spine    WEIGHT BEARING RESTRICTION  Weight Bearing Restriction: None                PAIN ASSESSMENT   Ratin  Location: Back  Management Techniques: Body  mechanics;Activity promotion;Repositioning;Breathing techniques    BALANCE                                                                                                                       Static Sitting: Fair +  Dynamic Sitting: Fair           Static Standing: Fair -  Dynamic Standing: Fair -    ACTIVITY TOLERANCE                         O2 WALK       AM-PAC '6-Clicks' INPATIENT SHORT FORM - BASIC MOBILITY  How much difficulty does the patient currently have...  Patient Difficulty: Turning over in bed (including adjusting bedclothes, sheets and blankets)?: A Little   Patient Difficulty: Sitting down on and standing up from a chair with arms (e.g., wheelchair, bedside commode, etc.): A Little   Patient Difficulty: Moving from lying on back to sitting on the side of the bed?: A Little   How much help from another person does the patient currently need...   Help from Another: Moving to and from a bed to a chair (including a wheelchair)?: A Little   Help from Another: Need to walk in hospital room?: A Little   Help from Another: Climbing 3-5 steps with a railing?: A Little     AM-PAC Score:  Raw Score: 18   Approx Degree of Impairment: 46.58%   Standardized Score (AM-PAC Scale): 43.63   CMS Modifier (G-Code): CK      Additional information:     THERAPEUTIC EXERCISES  Lower Extremity Ankle pumps  Glut sets  Quad sets     Position Supine       Patient End of Session: Up in chair;Call light within reach;RN aware of session/findings;All patient questions and concerns addressed    CURRENT GOALS     Patient Goal Patient's self-stated goal is: return home   Goal #1 Patient is able to demonstrate supine - sit EOB @ level: modified independent     Goal #1   Current Status Min a   Goal #2 Patient is able to demonstrate transfers Sit to/from Stand at assistance level: modified independent with walker - rolling     Goal #2  Current Status Min a   Goal #3 Patient is able to ambulate 200 feet with assist device: walker - rolling  at assistance level: modified independent   Goal #3   Current Status Pt amb 2 x 100 ft with RW and CGA   Goal #4 Patient will negotiate 12 stairs/one curb w/ assistive device and supervision   Goal #4   Current Status Navigated 4 stairs with CGA   Goal #5 Patient to demonstrate independence with home activity/exercise instructions provided to patient in preparation for discharge.   Goal #5   Current Status In progress   Goal #6    Goal #6  Current Status    Gait-15 minutes; There ex-15 minutes;There activity-15 minutes

## 2024-01-15 NOTE — PLAN OF CARE
Patient alert and oriented x 4. T12, L2, and L3 fracture- nonsurgical. Norco administered for pain as needed. Up with 1x w/ walker. Voiding freely. Patients diet is general. SCDs and heparin for VTE prophylaxis. Vital signs monitored. Cough and deep breathe. Bed in lowest position, call light within reach, and non skid socks in place for fall precautions. All needs within reach. Rounding done by nursing staff. Plan for discharge is home once cleared.     Patient cleared for discharge. After visit summary reviewed with patient. Aware to  medications at pharmacy on file. Personal belongings sent with patient. Transported via private car.         Problem: Patient Centered Care  Goal: Patient preferences are identified and integrated in the patient's plan of care  Description: Interventions:  - What would you like us to know as we care for you? I was sledding with my 12 year old daughter  - Provide timely, complete, and accurate information to patient/family  - Incorporate patient and family knowledge, values, beliefs, and cultural backgrounds into the planning and delivery of care  - Encourage patient/family to participate in care and decision-making at the level they choose  - Honor patient and family perspectives and choices  Outcome: Progressing     Problem: Patient/Family Goals  Goal: Patient/Family Long Term Goal  Description: Patient's Long Term Goal: To be discharged    Interventions:  - get clearance  - See additional Care Plan goals for specific interventions  Outcome: Progressing  Goal: Patient/Family Short Term Goal  Description: Patient's Short Term Goal: Manage pain    Interventions:   - monitor and assess pain  - See additional Care Plan goals for specific interventions  Outcome: Progressing     Problem: PAIN - ADULT  Goal: Verbalizes/displays adequate comfort level or patient's stated pain goal  Description: INTERVENTIONS:  - Encourage pt to monitor pain and request assistance  - Assess pain using  appropriate pain scale  - Administer analgesics based on type and severity of pain and evaluate response  - Implement non-pharmacological measures as appropriate and evaluate response  - Consider cultural and social influences on pain and pain management  - Manage/alleviate anxiety  - Utilize distraction and/or relaxation techniques  - Monitor for opioid side effects  - Notify MD/LIP if interventions unsuccessful or patient reports new pain  - Anticipate increased pain with activity and pre-medicate as appropriate  Outcome: Progressing     Problem: SAFETY ADULT - FALL  Goal: Free from fall injury  Description: INTERVENTIONS:  - Assess pt frequently for physical needs  - Identify cognitive and physical deficits and behaviors that affect risk of falls.  - Tolstoy fall precautions as indicated by assessment.  - Educate pt/family on patient safety including physical limitations  - Instruct pt to call for assistance with activity based on assessment  - Modify environment to reduce risk of injury  - Provide assistive devices as appropriate  - Consider OT/PT consult to assist with strengthening/mobility  - Encourage toileting schedule  Outcome: Progressing     Problem: DISCHARGE PLANNING  Goal: Discharge to home or other facility with appropriate resources  Description: INTERVENTIONS:  - Identify barriers to discharge w/pt and caregiver  - Include patient/family/discharge partner in discharge planning  - Arrange for needed discharge resources and transportation as appropriate  - Identify discharge learning needs (meds, wound care, etc)  - Arrange for interpreters to assist at discharge as needed  - Consider post-discharge preferences of patient/family/discharge partner  - Complete POLST form as appropriate  - Assess patient's ability to be responsible for managing their own health  - Refer to Case Management Department for coordinating discharge planning if the patient needs post-hospital services based on physician/LIP  order or complex needs related to functional status, cognitive ability or social support system  Outcome: Progressing

## 2024-01-22 ENCOUNTER — E-ADVICE (OUTPATIENT)
Dept: FAMILY MEDICINE | Age: 39
End: 2024-01-22

## 2024-01-22 ENCOUNTER — APPOINTMENT (OUTPATIENT)
Dept: FAMILY MEDICINE | Age: 39
End: 2024-01-22

## 2024-01-22 VITALS
OXYGEN SATURATION: 97 % | DIASTOLIC BLOOD PRESSURE: 72 MMHG | WEIGHT: 175.49 LBS | HEART RATE: 87 BPM | RESPIRATION RATE: 16 BRPM | HEIGHT: 63 IN | SYSTOLIC BLOOD PRESSURE: 105 MMHG | BODY MASS INDEX: 31.09 KG/M2 | TEMPERATURE: 96.8 F

## 2024-01-22 DIAGNOSIS — S22.000D COMPRESSION FRACTURE OF THORACIC VERTEBRA WITH ROUTINE HEALING, UNSPECIFIED THORACIC VERTEBRAL LEVEL, SUBSEQUENT ENCOUNTER: Primary | ICD-10-CM

## 2024-01-22 DIAGNOSIS — S32.000D COMPRESSION FRACTURE OF LUMBAR VERTEBRA WITH ROUTINE HEALING, UNSPECIFIED LUMBAR VERTEBRAL LEVEL, SUBSEQUENT ENCOUNTER: ICD-10-CM

## 2024-01-22 DIAGNOSIS — K64.9 HEMORRHOIDS, UNSPECIFIED HEMORRHOID TYPE: ICD-10-CM

## 2024-01-22 DIAGNOSIS — K57.32 DIVERTICULITIS OF COLON: ICD-10-CM

## 2024-01-22 DIAGNOSIS — K59.00 CONSTIPATION, UNSPECIFIED CONSTIPATION TYPE: ICD-10-CM

## 2024-01-22 PROCEDURE — 99214 OFFICE O/P EST MOD 30 MIN: CPT | Performed by: STUDENT IN AN ORGANIZED HEALTH CARE EDUCATION/TRAINING PROGRAM

## 2024-01-22 RX ORDER — CYCLOBENZAPRINE HCL 5 MG
5 TABLET ORAL 3 TIMES DAILY PRN
Qty: 30 TABLET | Refills: 0 | Status: SHIPPED | OUTPATIENT
Start: 2024-01-22

## 2024-01-22 RX ORDER — HYDROCORTISONE ACETATE 25 MG/1
25 SUPPOSITORY RECTAL 2 TIMES DAILY
Qty: 28 SUPPOSITORY | Refills: 0 | Status: SHIPPED | OUTPATIENT
Start: 2024-01-22

## 2024-01-22 RX ORDER — NAPROXEN 500 MG/1
500 TABLET ORAL 2 TIMES DAILY WITH MEALS
Qty: 60 TABLET | Refills: 1 | Status: SHIPPED | OUTPATIENT
Start: 2024-01-22

## 2024-01-22 RX ORDER — CALCITONIN SALMON 200 [IU]/.09ML
1 SPRAY, METERED NASAL DAILY
Qty: 3.7 ML | Refills: 0 | Status: SHIPPED | OUTPATIENT
Start: 2024-01-22 | End: 2024-02-19

## 2024-01-22 RX ORDER — HYDROCODONE BITARTRATE AND ACETAMINOPHEN 10; 325 MG/1; MG/1
1 TABLET ORAL
COMMUNITY
Start: 2024-01-15

## 2024-01-22 RX ORDER — PSEUDOEPHEDRINE HCL 30 MG
100 TABLET ORAL 2 TIMES DAILY
COMMUNITY
Start: 2024-01-15

## 2024-01-22 RX ORDER — POLYETHYLENE GLYCOL 3350 17 G/17G
17 POWDER, FOR SOLUTION ORAL DAILY
Qty: 30 EACH | Refills: 0 | Status: SHIPPED | OUTPATIENT
Start: 2024-01-22

## 2024-01-22 ASSESSMENT — PATIENT HEALTH QUESTIONNAIRE - PHQ9
1. LITTLE INTEREST OR PLEASURE IN DOING THINGS: NOT AT ALL
SUM OF ALL RESPONSES TO PHQ9 QUESTIONS 1 AND 2: 0
SUM OF ALL RESPONSES TO PHQ9 QUESTIONS 1 AND 2: 0
CLINICAL INTERPRETATION OF PHQ2 SCORE: NO FURTHER SCREENING NEEDED
2. FEELING DOWN, DEPRESSED OR HOPELESS: NOT AT ALL

## 2024-01-22 ASSESSMENT — PAIN SCALES - GENERAL: PAINLEVEL: 6

## 2024-01-24 NOTE — DISCHARGE SUMMARY
[facility]     DISCHARGE SUMMARY     Stephanie Hanks Patient Status:  Inpatient    1985 MRN [id number]   Location [facility] 4W/SW/SE Attending No att. providers found   Hosp Day # 2 PCP Missy Barrett     DATE OF ADMISSION: 2024  DATE OF DISCHARGE: 1/15/2024  DISPOSITION: home  CONDITION ON DISCHARGE: good    DISCHARGE DIAGNOSES:    Compression fracture of L3 vertebra, initial encounter (H[facility])    Compression fracture of L2 vertebra, initial encounter ([facility])    Compression fracture of T12 vertebra, initial encounter ([facility])    HISTORY OF PRESENT ILLNESS (COPIED FROM ADMISSION H&P)  Stephanie Hanks is a(n) 38 year old female, with a past medical history significant for diverticulitis presents with a complaint of sudden onset severe back pain.  Patient claims she was on a slide going down a hill when she had a bump, fell off the slide and hit her back noticed immediate severe pain in her lower back aggravated by minimal movement with no relieving factors.  Denies any radiation of the pain, denies any loss of bladder or bowel function.  No numbness or tingling in lower extremities.  She rates her pain as a 9 out of 10 at its worst.  CT scan of the lumbar spine indicates fracture of T12-L1 and L2    HOSPITAL COURSE:  Patient was admitted.  Pain controlled.  Seen by neurosurgery.  No intervention recommended.  No evidence of neurologic compromise.  Pain improved modestly during her short admission.  She did well with physical therapy and was able to walk around comfortably.  Cleared by neurosurgery for discharge    Patient understands return to the emergency room for increased pain, fever, discharge, shortness of breath, chest pain, new neurologic symptoms, other concerning symptoms.    PHYSICAL EXAM:     Gen: A+Ox3.  No distress.   HEENT: NCAT, neck supple, no carotid bruit.  CV: RRR, S1S2, and intact distal pulses. No gallop, rub, murmur.  Pulm: Effort and breath sounds normal. No  distress, wheezes, rales, rhonchi.  Abd: Soft, NTND, BS normal, no mass, no HSM, no rebound/guarding.   Neuro: Normal reflexes, CN. Sensory/motor exams grossly normal deficit. Coordination  and gait normal.   MS: No joint effusions.  No peripheral edema.  Skin: Skin is warm and dry. No rashes, erythema, diaphoresis.   Psych: Normal mood and affect. Behavior and judgment normal.     DISCHARGE MEDICATIONS     Discharge Medications        START taking these medications        Instructions Prescription details   docusate sodium 100 MG Caps  Commonly known as: COLACE      Take 100 mg by mouth 2 (two) times daily.   Refills: 0     HYDROcodone-acetaminophen  MG Tabs  Commonly known as: Norco      Take 1 tablet by mouth every 4 (four) hours as needed.   Quantity: 30 tablet  Refills: 0               Where to Get Your Medications        These medications were sent to [facility]/pharmacy #[id number] - [place], [place] - [address] AT [facility], [phone], [phone]  [address] [facility] [address]      Hours: 24-hours Phone: [phone]   HYDROcodone-acetaminophen  MG Tabs         CONSULTANTS  Consultants         Provider   Role Specialty     Wilfrid Stanton MD  Consulting Physician NEUROSURGERY            FOLLOW UP:  Wilfrid Stanton MD  [address]  [facility] [id number] [phone]    Follow up in 3 week(s)  Post Discharge Followup    Missy Barrett  [address]   [facility] [address] [phone]    Follow up in 1 week(s)  Post Discharge Followup    The above plan and follow-up instructions were reviewed with the patient and they verbalized understanding and agreement.  They understand to return to the emergency room for any concerning signs or symptoms.  Greater than 30 minutes spent on discharge.  -----------------------    Hospital Discharge Diagnoses:  Compression fracture    Lace+ Score: 18  59-90 High Risk  29-58 Medium Risk  0-28   Low Risk.    TCM  Follow-Up Recommendation:  LACE < 29: Low Risk of readmission after discharge from the hospital. No TCM follow-up needed.

## 2024-01-30 ENCOUNTER — APPOINTMENT (OUTPATIENT)
Dept: SURGERY | Age: 39
End: 2024-01-30

## 2024-01-30 ENCOUNTER — E-ADVICE (OUTPATIENT)
Dept: OTHER | Age: 39
End: 2024-01-30

## 2024-02-05 ENCOUNTER — TELEPHONE (OUTPATIENT)
Dept: SURGERY | Facility: CLINIC | Age: 39
End: 2024-02-05

## 2024-02-05 DIAGNOSIS — S22.080A COMPRESSION FRACTURE OF T12 VERTEBRA, INITIAL ENCOUNTER (HCC): Primary | ICD-10-CM

## 2024-02-05 DIAGNOSIS — M53.3 SACROCOCCYGEAL PAIN: ICD-10-CM

## 2024-02-05 DIAGNOSIS — S32.020A COMPRESSION FRACTURE OF L2 VERTEBRA, INITIAL ENCOUNTER (HCC): ICD-10-CM

## 2024-02-05 DIAGNOSIS — S32.010A COMPRESSION FRACTURE OF L1 VERTEBRA, INITIAL ENCOUNTER (HCC): ICD-10-CM

## 2024-02-05 DIAGNOSIS — Z09 HOSPITAL DISCHARGE FOLLOW-UP: ICD-10-CM

## 2024-02-05 NOTE — TELEPHONE ENCOUNTER
Patient is requesting to have a note sent to her mychart for work stating that apt had to be changed to [facility] due to her already having PTO requested an now need to have that changed to 02/08/24 per Dr. Stanton note on 01/14/23 to follow up with [facility]. Please advise

## 2024-02-05 NOTE — TELEPHONE ENCOUNTER
Noted the patient message listed below.     Called pt to clarify the pt request.   Pt states:  Appt for 2/6/2024 and requested PTO  And asking PTO to be used on 2/8/2024 and was rescheduled.     Communication initiated     Routed to the provider for review and electronic signature

## 2024-02-06 NOTE — TELEPHONE ENCOUNTER
Patient recently seen in the hospital for multiple compression fractures, T12, L1 and L2.    Letter sent to the patient's MyChart as requested.    Patient to see me this week on 2/8/2024.  She will require repeat x-ray imaging.  X-ray lumbar spine ordered.  She also endorses tailbone pain.  She is unable to sit without a cushion or pillow.  I ordered a x-ray of the sacrum/coccyx to further assess.    Advised of any new, worsening or concerning signs/symptoms to please call, follow-up sooner or go directly to the ED.    The patient agreed to the plan, verbalized understanding and was very appreciative.

## 2024-02-08 ENCOUNTER — HOSPITAL ENCOUNTER (OUTPATIENT)
Dept: GENERAL RADIOLOGY | Facility: HOSPITAL | Age: 39
Discharge: HOME | End: 2024-02-08
Attending: PHYSICIAN ASSISTANT
Payer: COMMERCIAL

## 2024-02-08 ENCOUNTER — OFFICE VISIT (OUTPATIENT)
Dept: SURGERY | Facility: CLINIC | Age: 39
End: 2024-02-08
Payer: COMMERCIAL

## 2024-02-08 VITALS
BODY MASS INDEX: 31.36 KG/M2 | SYSTOLIC BLOOD PRESSURE: 98 MMHG | HEART RATE: 98 BPM | HEIGHT: 63 IN | WEIGHT: 177 LBS | DIASTOLIC BLOOD PRESSURE: 68 MMHG

## 2024-02-08 DIAGNOSIS — S22.080A COMPRESSION FRACTURE OF T12 VERTEBRA, INITIAL ENCOUNTER (HCC): ICD-10-CM

## 2024-02-08 DIAGNOSIS — Z09 HOSPITAL DISCHARGE FOLLOW-UP: ICD-10-CM

## 2024-02-08 DIAGNOSIS — S32.020D CLOSED COMPRESSION FRACTURE OF L2 LUMBAR VERTEBRA WITH ROUTINE HEALING, SUBSEQUENT ENCOUNTER: ICD-10-CM

## 2024-02-08 DIAGNOSIS — S32.010D CLOSED COMPRESSION FRACTURE OF L1 LUMBAR VERTEBRA WITH ROUTINE HEALING, SUBSEQUENT ENCOUNTER: ICD-10-CM

## 2024-02-08 DIAGNOSIS — M53.3 SACROCOCCYGEAL PAIN: ICD-10-CM

## 2024-02-08 DIAGNOSIS — S22.080D COMPRESSION FRACTURE OF T12 VERTEBRA WITH ROUTINE HEALING, SUBSEQUENT ENCOUNTER: Primary | ICD-10-CM

## 2024-02-08 DIAGNOSIS — M53.3 SACROCOXALGIA: ICD-10-CM

## 2024-02-08 DIAGNOSIS — S32.020A COMPRESSION FRACTURE OF L2 VERTEBRA, INITIAL ENCOUNTER (HCC): ICD-10-CM

## 2024-02-08 DIAGNOSIS — S22.008D CLOSED FRACTURE OF SPINOUS PROCESS OF THORACIC VERTEBRA WITH ROUTINE HEALING, SUBSEQUENT ENCOUNTER: ICD-10-CM

## 2024-02-08 DIAGNOSIS — S32.010A COMPRESSION FRACTURE OF L1 VERTEBRA, INITIAL ENCOUNTER (HCC): ICD-10-CM

## 2024-02-08 PROCEDURE — 3008F BODY MASS INDEX DOCD: CPT | Performed by: PHYSICIAN ASSISTANT

## 2024-02-08 PROCEDURE — 99204 OFFICE O/P NEW MOD 45 MIN: CPT | Performed by: PHYSICIAN ASSISTANT

## 2024-02-08 PROCEDURE — 3074F SYST BP LT 130 MM HG: CPT | Performed by: PHYSICIAN ASSISTANT

## 2024-02-08 PROCEDURE — 72110 X-RAY EXAM L-2 SPINE 4/>VWS: CPT | Performed by: PHYSICIAN ASSISTANT

## 2024-02-08 PROCEDURE — 72220 X-RAY EXAM SACRUM TAILBONE: CPT | Performed by: PHYSICIAN ASSISTANT

## 2024-02-08 PROCEDURE — 3078F DIAST BP <80 MM HG: CPT | Performed by: PHYSICIAN ASSISTANT

## 2024-02-08 RX ORDER — CYCLOBENZAPRINE HCL 5 MG
TABLET ORAL 3 TIMES DAILY PRN
Qty: 40 TABLET | Refills: 0 | Status: SHIPPED | OUTPATIENT
Start: 2024-02-08

## 2024-02-08 RX ORDER — CALCITONIN SALMON 200 [IU]/.09ML
1 SPRAY, METERED NASAL DAILY
COMMUNITY

## 2024-02-08 RX ORDER — NAPROXEN 500 MG/1
500 TABLET ORAL 2 TIMES DAILY WITH MEALS
COMMUNITY
Start: 2024-01-22

## 2024-02-08 RX ORDER — CYCLOBENZAPRINE HCL 5 MG
1 TABLET ORAL 3 TIMES DAILY PRN
COMMUNITY
Start: 2024-01-22 | End: 2024-02-08 | Stop reason: DRUGHIGH

## 2024-02-08 NOTE — PATIENT INSTRUCTIONS
Refill policies:    Allow 2-3 business days for refills; controlled substances may take longer.  Contact your pharmacy at least 5 days prior to running out of medication and have them send an electronic request or submit request through the “request refill” option in your [employer] account.  Refills are not addressed on weekends; covering physicians do not authorize routine medications on weekends.  No narcotics or controlled substances are refilled after noon on Fridays or by on call physicians.  By law, narcotics must be electronically prescribed.  A 30 day supply with no refills is the maximum allowed.  If your prescription is due for a refill, you may be due for a follow up appointment.  To best provide you care, patients receiving routine medications need to be seen at least once a year.  Patients receiving narcotic/controlled substance medications need to be seen at least once every 3 months.  In the event that your preferred pharmacy does not have the requested medication in stock (e.g. Backordered), it is your responsibility to find another pharmacy that has the requested medication available.  We will gladly send a new prescription to that pharmacy at your request.    Scheduling Tests:    If your physician has ordered radiology tests such as MRI or CT scans, please contact Central Scheduling at [phone] right away to schedule the test.  Once scheduled, the [facility] Centralized Referral Team will work with your insurance carrier to obtain pre-certification or prior authorization.  Depending on your insurance carrier, approval may take 3-10 days.  It is highly recommended patients assure they have received an authorization before having a test performed.  If test is done without insurance authorization, patient may be responsible for the entire amount billed.      Precertification and Prior Authorizations:  If your physician has recommended that you have a procedure or additional testing performed the [facility]  Centralized Referral Team will contact your insurance carrier to obtain pre-certification or prior authorization.    You are strongly encouraged to contact your insurance carrier to verify that your procedure/test has been approved and is a COVERED benefit.  Although the [facility] Centralized Referral Team does its due diligence, the insurance carrier gives the disclaimer that \"Although the procedure is authorized, this does not guarantee payment.\"    Ultimately the patient is responsible for payment.   Thank you for your understanding in this matter.  Paperwork Completion:  If you require FMLA or disability paperwork for your recovery, please make sure to either drop it off or have it faxed to our office at [phone]. Be sure the form has your name and date of birth on it.  The form will be faxed to our Forms Department and they will complete it for you.  There is a 25$ fee for all forms that need to be filled out.  Please be aware there is a 10-14 day turnaround time.  You will need to sign a release of information (JOSLYN) form if your paperwork does not come with one.  You may call the Forms Department with any questions at [phone].  Their fax number is [phone].

## 2024-02-08 NOTE — PROGRESS NOTES
Patient here today for a follow up on her L3 compression FX ..      Patient states the pain is pain is better on the back,  states her tailbone has pain and she can't sit.    Pain 2/10    XR in chart

## 2024-02-08 NOTE — PROGRESS NOTES
Patient: Stephanie Hanks  Medical Record Number: [id number]  YOB: 1985  PCP: Missy Barrett    Reason for visit: Lumbar follow up, hospital follow up, multiple compression fxs, sacrococcygeal pain    HISTORY OF CHIEF COMPLAINT:    Stephanie Hanks is a very pleasant 38 year old female who presents today for: Lumbar follow up, hospital follow up, multiple compression fxs, sacrococcygeal pain  The patient endorses improvement of her low back pain.  It is aggravated at night when she is sleeping.  Occasionally wakes her up.  She describes muscle tension and tightness, which can be expected with this injury.  She endorses tailbone pain.  She is unable to sit unless using a cushion.  She would like to continue to work from home, as there are multiple stairs at her job.  These aggravate her symptoms.  No radiating lower extremity pain, numbness, tingling or weakness.  No bladder/bowel incontinence/retention.    Hospital Consult: 1/14/24  HISTORY OF PRESENT ILLNESS:  Stephanie Hanks is a(n) 38 year old female who presented to the emergency department yesterday after a sledding accident.  She was on a slide with her daughter.  They fell off the slide after hitting a small rise, and the patient struck her buttocks and back.     She reports pain in the lumbar and thoracolumbar region, particularly on the right.  The pain is intense.  She denies any saddle anesthesia, subjective leg weakness, numbness, or radiculopathy.  However, she notes that she has not urinated for about 18 hours.    History reviewed. No pertinent past medical history.   History reviewed. No pertinent surgical history.   History reviewed. No pertinent family history.   Social History     Socioeconomic History    Marital status: Single   Tobacco Use    Smoking status: Never    Smokeless tobacco: Never   Substance and Sexual Activity    Alcohol use: Not Currently    Drug use: Never      No Known Allergies   Current Medications:  Current  Outpatient Medications   Medication Sig Dispense Refill    calcitonin, salmon, 200 UNIT/ACT Nasal Solution 1 spray by Nasal route daily.      Cholecalciferol 50 MCG (2000 UT) Oral Cap Take by mouth daily.      cyclobenzaprine 5 MG Oral Tab Take 1 tablet (5 mg total) by mouth 3 (three) times daily as needed.      naproxen 500 MG Oral Tab Take 1 tablet (500 mg total) by mouth 2 (two) times daily with meals.      docusate sodium 100 MG Oral Cap Take 100 mg by mouth 2 (two) times daily.          REVIEW OF SYSTEMS   Comprehensive review of systems done. Negative except what is outlined in the above HPI.     PHYSICAL EXAMIMATION    height is 63\" and weight is 177 lb (80.3 kg). Her blood pressure is 98/68 and her pulse is 98.   GENERAL: Very pleasant patient is in no apparent distress. Sitting comfortably in the examination chair.   HEENT: Normocephalic, atraumatic.  RESPIRATORY RATE: Easy and Even  SKIN: Warm and dry  NEURO: Awake, alert and orientated. Speech fluent, comprehension intact, answering questions appropriately.     SPINE:  Gait/Coordination: Gait deferred.  Sensation: Sensory deficits noted on bilateral lower extremities to light touch: None   Palpation: Positive tenderness to palpation over the lower thoracic, upper lumbar region.  Mildly tender over the sacral region    Moving bilateral upper extremities spontaneously to full resistance   Lower Extremity Strength:     Iliopsoas  Hamstrings   Quads    D-flexion P-flexion Great Toe   Right       5         5       5         5 5 5   Left       5         5       5         5 5 5     Tests:   Test Right   (POS or NEG) Left   (POS or NEG)   Clonus Neg Neg     DATA:   None    IMAGING:   Imaging note from 1/14/23: Dr. Stanton:  Neurosurgery     Upright lumbar x-rays reviewed.  Good alignment.  Patient may follow-up in [facility] in 3-4 weeks with new upright lumbar x-rays.        MEDICAL DECISION MAKING:     ASSESSMENT and PLAN:    ICD-10-CM    1. Compression fracture  of T12 vertebra with routine healing, subsequent encounter  S22.080D cyclobenzaprine 5 MG Oral Tab     XR LUMBAR SPINE (MIN 2 VIEWS) (CPT=72100)      2. Closed compression fracture of L1 lumbar vertebra with routine healing, subsequent encounter  S32.010D cyclobenzaprine 5 MG Oral Tab     XR LUMBAR SPINE (MIN 2 VIEWS) (CPT=72100)      3. Closed compression fracture of L2 lumbar vertebra with routine healing, subsequent encounter  S32.020D cyclobenzaprine 5 MG Oral Tab     XR LUMBAR SPINE (MIN 2 VIEWS) (CPT=72100)      4. Sacrocoxalgia  M53.3       5. Closed fracture of spinous process of thoracic vertebra with routine healing, subsequent encounter  S22.008D           PLAN:   1. Medication:    -Flexeril refilled:      -Side effects reviewed, including but not limited to, drowsiness.  Do not drive or operate machinery on this medication therapy   2. Imaging:    - Reviewed today:    -X-ray lumbar:     -T12, L1 and L2 compression deformities and T12 spinous process fx appear stable.      -X-ray sacrum/coccyx:     -No apparent fracture noted, will await report   - Ordered today:    -X-ray lumbar spine:     -Complete in 4-5 weeks, approximately 2 months from her sledding injury  3. Activity:    -Encouraged rest   -No excessive bending, lifting, straining, reaching or twisting   -Pillow cushion as needed for comfort  4. Work:    -Letter provided to work from home  5. Follow up in 4-5 weeks or call or follow up sooner or go to the ED for any new, worsening or concerning signs or symptoms     I reviewed imaging. I discussed the plan and reviewed imaging with the patient. The patient agrees with the plan, verbalized understanding and is appreciative. All questions were sought out and thoroughly answered to satisfaction.       Total visit time: 40 minutes  More than 50% spent coordinating care, providing patient education, reviewing imaging, discussing further imaging, discussing activity, providing work letter, discussing  medication therapy and counseling.    June Bradford M.S., PAEsterC  [address], [place] [address]  [phone]  2/8/2024 9:32 AM    Dragon speech recognition software was used to prepare this note. If a word or phrase is confusing, it is likely due to a failure of recognition. Please contact me with any questions or clarifications.

## 2024-02-22 ENCOUNTER — APPOINTMENT (OUTPATIENT)
Dept: OBGYN | Age: 39
End: 2024-02-22
Attending: STUDENT IN AN ORGANIZED HEALTH CARE EDUCATION/TRAINING PROGRAM

## 2024-02-29 ENCOUNTER — APPOINTMENT (OUTPATIENT)
Dept: OBGYN | Age: 39
End: 2024-02-29
Attending: STUDENT IN AN ORGANIZED HEALTH CARE EDUCATION/TRAINING PROGRAM

## 2024-02-29 VITALS
WEIGHT: 171.08 LBS | TEMPERATURE: 98 F | BODY MASS INDEX: 30.31 KG/M2 | OXYGEN SATURATION: 100 % | HEART RATE: 91 BPM | HEIGHT: 63 IN | DIASTOLIC BLOOD PRESSURE: 73 MMHG | SYSTOLIC BLOOD PRESSURE: 112 MMHG

## 2024-02-29 DIAGNOSIS — Z01.419 ENCOUNTER FOR WELL WOMAN EXAM WITH ROUTINE GYNECOLOGICAL EXAM: Primary | ICD-10-CM

## 2024-02-29 PROCEDURE — 99395 PREV VISIT EST AGE 18-39: CPT | Performed by: OBSTETRICS & GYNECOLOGY

## 2024-02-29 ASSESSMENT — PATIENT HEALTH QUESTIONNAIRE - PHQ9
2. FEELING DOWN, DEPRESSED OR HOPELESS: NOT AT ALL
CLINICAL INTERPRETATION OF PHQ2 SCORE: NO FURTHER SCREENING NEEDED
SUM OF ALL RESPONSES TO PHQ9 QUESTIONS 1 AND 2: 0
1. LITTLE INTEREST OR PLEASURE IN DOING THINGS: NOT AT ALL
SUM OF ALL RESPONSES TO PHQ9 QUESTIONS 1 AND 2: 0

## 2024-03-14 ENCOUNTER — HOSPITAL ENCOUNTER (OUTPATIENT)
Dept: GENERAL RADIOLOGY | Facility: HOSPITAL | Age: 39
Discharge: HOME | End: 2024-03-14
Attending: PHYSICIAN ASSISTANT
Payer: COMMERCIAL

## 2024-03-14 ENCOUNTER — OFFICE VISIT (OUTPATIENT)
Dept: SURGERY | Facility: CLINIC | Age: 39
End: 2024-03-14
Payer: COMMERCIAL

## 2024-03-14 ENCOUNTER — PATIENT MESSAGE (OUTPATIENT)
Dept: SURGERY | Facility: CLINIC | Age: 39
End: 2024-03-14

## 2024-03-14 DIAGNOSIS — S32.010D CLOSED COMPRESSION FRACTURE OF L1 LUMBAR VERTEBRA WITH ROUTINE HEALING, SUBSEQUENT ENCOUNTER: ICD-10-CM
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
## 2024-03-14 DIAGNOSIS — S22.008D CLOSED FRACTURE OF SPINOUS PROCESS OF THORACIC VERTEBRA WITH ROUTINE HEALING, SUBSEQUENT ENCOUNTER: ICD-10-CM

## 2024-03-14 DIAGNOSIS — S32.020D CLOSED COMPRESSION FRACTURE OF L2 LUMBAR VERTEBRA WITH ROUTINE HEALING, SUBSEQUENT ENCOUNTER: ICD-10-CM

## 2024-03-14 DIAGNOSIS — M53.3 SACROCOCCYGEAL PAIN: ICD-10-CM

## 2024-03-14 DIAGNOSIS — S22.080D COMPRESSION FRACTURE OF T12 VERTEBRA WITH ROUTINE HEALING, SUBSEQUENT ENCOUNTER: Primary | ICD-10-CM

## 2024-03-14 DIAGNOSIS — S22.080D COMPRESSION FRACTURE OF T12 VERTEBRA WITH ROUTINE HEALING, SUBSEQUENT ENCOUNTER: ICD-10-CM

## 2024-03-14 PROCEDURE — 72100 X-RAY EXAM L-S SPINE 2/3 VWS: CPT | Performed by: PHYSICIAN ASSISTANT

## 2024-03-14 PROCEDURE — 99213 OFFICE O/P EST LOW 20 MIN: CPT | Performed by: PHYSICIAN ASSISTANT

## 2024-03-14 NOTE — PROGRESS NOTES
Patient is here for follow up on a T12 fracture she sustained from a sledding accident.  She had a repeat x-ray today and is here to discuss the results. She started having a throbbing pain in her low back 2 days ago.  Lying down helps.  She takes a muscle relaxer and naproxen.
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
## 2024-03-14 NOTE — TELEPHONE ENCOUNTER
From: Stephanie Hanks  To: June Bradford  Sent: 3/14/2024 10:28 AM CDT  Subject: MRI APPOINTMENT    I called right away to schedule appt. The soonest they had was 4/17 for the lumbar and 4/15 saccrum. Unless you want me to do it somewhere else. Please let me know. Thank you in advance!

## 2024-03-14 NOTE — TELEPHONE ENCOUNTER
Although the patient has persistent midline back pain, she has been improving gradually over time.  No new neurologic complaints or concerns on exam such as clonus or muscle strength weakness.  Unable to order stat.  I recommend placing herself on the cancellation list or seeing if [facility] has a sooner location.  She can discuss outside locations with her insurance, although we cannot guarantee the quality of outside imaging.

## 2024-03-14 NOTE — PATIENT INSTRUCTIONS
Refill policies:    Allow 2-3 business days for refills; controlled substances may take longer.  Contact your pharmacy at least 5 days prior to running out of medication and have them send an electronic request or submit request through the “request refill” option in your [employer] account.  Refills are not addressed on weekends; covering physicians do not authorize routine medications on weekends.  No narcotics or controlled substances are refilled after noon on Fridays or by on call physicians.  By law, narcotics must be electronically prescribed.  A 30 day supply with no refills is the maximum allowed.  If your prescription is due for a refill, you may be due for a follow up appointment.  To best provide you care, patients receiving routine medications need to be seen at least once a year.  Patients receiving narcotic/controlled substance medications need to be seen at least once every 3 months.  In the event that your preferred pharmacy does not have the requested medication in stock (e.g. Backordered), it is your responsibility to find another pharmacy that has the requested medication available.  We will gladly send a new prescription to that pharmacy at your request.    Scheduling Tests:    If your physician has ordered radiology tests such as MRI or CT scans, please contact Central Scheduling at [phone] right away to schedule the test.  Once scheduled, the [facility] Centralized Referral Team will work with your insurance carrier to obtain pre-certification or prior authorization.  Depending on your insurance carrier, approval may take 3-10 days.  It is highly recommended patients assure they have received an authorization before having a test performed.  If test is done without insurance authorization, patient may be responsible for the entire amount billed.      Precertification and Prior Authorizations:  If your physician has recommended that you have a procedure or additional testing performed the [facility]  Centralized Referral Team will contact your insurance carrier to obtain pre-certification or prior authorization.    You are strongly encouraged to contact your insurance carrier to verify that your procedure/test has been approved and is a COVERED benefit.  Although the [facility] Centralized Referral Team does its due diligence, the insurance carrier gives the disclaimer that \"Although the procedure is authorized, this does not guarantee payment.\"    Ultimately the patient is responsible for payment.   Thank you for your understanding in this matter.  Paperwork Completion:  If you require FMLA or disability paperwork for your recovery, please make sure to either drop it off or have it faxed to our office at [phone]. Be sure the form has your name and date of birth on it.  The form will be faxed to our Forms Department and they will complete it for you.  There is a 25$ fee for all forms that need to be filled out.  Please be aware there is a 10-14 day turnaround time.  You will need to sign a release of information (JOSLYN) form if your paperwork does not come with one.  You may call the Forms Department with any questions at [phone].  Their fax number is [phone].

## 2024-04-15 ENCOUNTER — HOSPITAL ENCOUNTER (OUTPATIENT)
Dept: MRI IMAGING | Age: 39
Discharge: HOME | End: 2024-04-15
Attending: PHYSICIAN ASSISTANT
Payer: COMMERCIAL

## 2024-04-15 DIAGNOSIS — S22.080D COMPRESSION FRACTURE OF T12 VERTEBRA WITH ROUTINE HEALING, SUBSEQUENT ENCOUNTER: ICD-10-CM

## 2024-04-15 DIAGNOSIS — S32.010D CLOSED COMPRESSION FRACTURE OF L1 LUMBAR VERTEBRA WITH ROUTINE HEALING, SUBSEQUENT ENCOUNTER: ICD-10-CM

## 2024-04-15 DIAGNOSIS — S32.020D CLOSED COMPRESSION FRACTURE OF L2 LUMBAR VERTEBRA WITH ROUTINE HEALING, SUBSEQUENT ENCOUNTER: ICD-10-CM

## 2024-04-15 DIAGNOSIS — M53.3 SACROCOCCYGEAL PAIN: ICD-10-CM

## 2024-04-15 DIAGNOSIS — S22.008D CLOSED FRACTURE OF SPINOUS PROCESS OF THORACIC VERTEBRA WITH ROUTINE HEALING, SUBSEQUENT ENCOUNTER: ICD-10-CM
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
## 2024-04-15 PROCEDURE — 72195 MRI PELVIS W/O DYE: CPT | Performed by: PHYSICIAN ASSISTANT

## 2024-04-16 ENCOUNTER — PATIENT MESSAGE (OUTPATIENT)
Dept: ADMINISTRATIVE | Age: 39
End: 2024-04-16

## 2024-04-16 NOTE — TELEPHONE ENCOUNTER
Danielle  online to initiate authorization    MRI SPINE LUMBAR (CPT=72148)          Referral #: [id number]      Scheduled For: 04/17/2024    Status: pending authorization > To discuss this case with the reviewer, contact Moses at [phone]  .    Use Reference Case Number: [id number]             Clinical notes sent for review.     Patient notified of pending status via [employer].     Appt Desk > Noted

## 2024-04-17 ENCOUNTER — PATIENT MESSAGE (OUTPATIENT)
Dept: SURGERY | Facility: CLINIC | Age: 39
End: 2024-04-17

## 2024-04-17 ENCOUNTER — E-ADVICE (OUTPATIENT)
Dept: OBGYN | Age: 39
End: 2024-04-17

## 2024-04-17 NOTE — TELEPHONE ENCOUNTER
Message from pt below noted.  Routed to provider    MRI Sacrum/Coccyx  \"CONCLUSION:      Nondisplaced fracture coccygeal tip which may be subacute.  Mild surrounding soft tissue edema.      Incidentally noted 2.6 cm mildly complex left ovarian cyst.  Follow-up pelvic ultrasound recommended.\"

## 2024-04-17 NOTE — TELEPHONE ENCOUNTER
Attempted to call patient. Reached VM. Unidentified VM, left limited VM to return call.     Sent [employer]Got message. Awaiting response.

## 2024-04-17 NOTE — TELEPHONE ENCOUNTER
From: Stephanie Hanks  To: June Bradford  Sent: 4/17/2024 10:52 AM CDT  Subject: MRI saccrum done and MRI lumbar reschedule    Good morning Dr. Bradford! I got a message from scheduling that my MRI Lumbar is pending insurance. Im not sure if its pending medical review or more clinicals. I had the appt today and I had to reschedule for May 13 due to MRI lumbar pending approval. I did do the MRI saccrum. Let me know what is next? Thank you in advance.

## 2024-05-13 ENCOUNTER — HOSPITAL ENCOUNTER (OUTPATIENT)
Dept: MRI IMAGING | Age: 39
Discharge: HOME | End: 2024-05-13
Attending: PHYSICIAN ASSISTANT

## 2024-05-13 DIAGNOSIS — M53.3 SACROCOCCYGEAL PAIN: ICD-10-CM

## 2024-05-13 DIAGNOSIS — S22.008D CLOSED FRACTURE OF SPINOUS PROCESS OF THORACIC VERTEBRA WITH ROUTINE HEALING, SUBSEQUENT ENCOUNTER: ICD-10-CM
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
## 2024-05-13 DIAGNOSIS — S32.020D CLOSED COMPRESSION FRACTURE OF L2 LUMBAR VERTEBRA WITH ROUTINE HEALING, SUBSEQUENT ENCOUNTER: ICD-10-CM

## 2024-05-13 DIAGNOSIS — S32.010D CLOSED COMPRESSION FRACTURE OF L1 LUMBAR VERTEBRA WITH ROUTINE HEALING, SUBSEQUENT ENCOUNTER: ICD-10-CM

## 2024-05-13 DIAGNOSIS — S22.080D COMPRESSION FRACTURE OF T12 VERTEBRA WITH ROUTINE HEALING, SUBSEQUENT ENCOUNTER: ICD-10-CM

## 2024-05-13 PROCEDURE — 72148 MRI LUMBAR SPINE W/O DYE: CPT | Performed by: PHYSICIAN ASSISTANT

## 2024-05-23 ENCOUNTER — PATIENT MESSAGE (OUTPATIENT)
Dept: SURGERY | Facility: CLINIC | Age: 39
End: 2024-05-23

## 2024-05-23 DIAGNOSIS — M53.3 COCCYDYNIA: Primary | ICD-10-CM

## 2024-05-23 DIAGNOSIS — S32.020D CLOSED COMPRESSION FRACTURE OF L2 LUMBAR VERTEBRA WITH ROUTINE HEALING, SUBSEQUENT ENCOUNTER: ICD-10-CM

## 2024-05-23 DIAGNOSIS — S32.2XXA CLOSED FRACTURE OF COCCYX, INITIAL ENCOUNTER (HCC): ICD-10-CM

## 2024-05-23 DIAGNOSIS — S32.010D CLOSED COMPRESSION FRACTURE OF L1 LUMBAR VERTEBRA WITH ROUTINE HEALING, SUBSEQUENT ENCOUNTER: ICD-10-CM

## 2024-05-23 DIAGNOSIS — S22.080D COMPRESSION FRACTURE OF T12 VERTEBRA WITH ROUTINE HEALING, SUBSEQUENT ENCOUNTER: ICD-10-CM

## 2024-05-23 NOTE — TELEPHONE ENCOUNTER
I reviewed the patient's MRI lumbar spine with her.  Fractures appear to be healing well.  No further imaging recommended at this time, given she is multiple months out from her injury.  I recommend beginning physical therapy.  For the coccyx fracture, no further follow-up from a surgical standpoint.  Given continued coccyx pain, I recommend she see physiatry for further evaluation and treatment.  The patient may follow-up with neurosurgery as needed.  Patient agreed to the plan, verbalized understanding and was very appreciative.

## 2024-05-23 NOTE — TELEPHONE ENCOUNTER
From: Stephanie Hanks  To: June Bradford  Sent: 5/23/2024 9:47 AM CDT  Subject: MRI Results    Good morning! I got the results. Am I going to be able to start therapy? Im hoping for good news. Thank you!

## 2024-05-23 NOTE — TELEPHONE ENCOUNTER
Noted that patient has messaged requesting to start physical therapy after completing imaging:  MRI lumbar spine done on 5.13.24.     Per EDI Bonner at [facility] on 3.14.24:    \"PLAN:   1. Medication: None prescribed  2. Imaging:                 - Reviewed today:                              -X-ray lumbar:                                            -T12, L1 and L2 compression fractures and T12 spinous process fracture appears stable                - Ordered today:                              -MRI sacrum/coccyx and MRI lumbar spine:                                            -Patient is 2 months out from a sledding injury resulting in T12, L1 and L2 compression fractures as well as T12 spinous process fracture.  2 months out from her injury, she is still with tenderness midline, most prominently at T12 and L1.  She has continued sacrococcygeal pain.  I recommend MRI imaging to further assess given no significant improvement with time and rest.  Pending MRI imaging, we will consider physical therapy as well as potential referral to physiatry for assessment for injection for the spinous process fracture.  3. Work:                - Letter provided   4. Follow up after MRI imaging or call or follow up sooner or go to the ED for any new, worsening or concerning signs or symptoms\"    Routed to EDI Bonner.

## 2024-06-17 ENCOUNTER — TELEPHONE (OUTPATIENT)
Dept: PHYSICAL THERAPY | Facility: HOSPITAL | Age: 39
End: 2024-06-17

## 2024-06-18 ENCOUNTER — OFFICE VISIT (OUTPATIENT)
Dept: PHYSICAL THERAPY | Age: 39
End: 2024-06-18
Attending: PHYSICIAN ASSISTANT

## 2024-06-18 PROCEDURE — 97110 THERAPEUTIC EXERCISES: CPT

## 2024-06-18 PROCEDURE — 97161 PT EVAL LOW COMPLEX 20 MIN: CPT

## 2024-06-18 NOTE — PROGRESS NOTES
LUMBAR SPINE EVALUATION:     Diagnosis:   Compression fracture of T12 vertebra with routine healing, subsequent encounter (S22.080D)  Closed compression fracture of L1 lumbar vertebra with routine healing, subsequent encounter (S32.010D)  Closed compression fracture of L2 lumbar vertebra with routine healing, subsequent encounter (S32.020D)  Closed fracture of coccyx, initial encounter ([facility]) (S32.2XXA)  Coccydynia (M53.3)      Referring Provider: [facility]  Date of Evaluation:    6/18/2024    Precautions:   Compression fx T12, L1, L2, coccyx Next MD visit:   none scheduled  Date of Surgery: n/a     PATIENT SUMMARY   Stephanie Hanks is a 39 year old female who presents to therapy today with complaints of low back and coccyx pain secondary to a sledding accident on January 13, 2024. Patient states that she fell on her buttocks off the sled and may have also injured her right shoulder by bracing. Patient reports that with the accident she fractured T12, L1, L2 and her coccyx. Patient states that she had been following with neurosurgery who recommended conservative care. Patient has been referred to physiatry for her coccyx pain.     Progression since onset: improving  Prior treatments: pain medications  Current functional limitations include sitting, bed mobility, standing, household chores, e.g. mopping.    Pain/symptom behavior:  P1: across low back and lower abdomen  NPRS: current 0/10, at best 0/10, at worst 8/10  Description: poking   Depth: superficial   Aggravating factors: sit up from out of bed, \"heavy\" when standing, sitting, missed a step while descending stairs, \"overdoing household chores,\" mopping, laying on side  Easing factors: standing, walking, laying flat on back  24-hour pattern: morning   Irritability: gradual onset, goes away once sitting down    P2: coccyx  NPRS: current 4/10, at best 0/10, at worst 9/10  Description: sharp  Depth: deep  Aggravating factors: sitting, worse when sitting on softer  surfaces - limited to 3 hour  Easing factors: standing  Periodicity: daily   24-hour pattern: worst at night  Irritability: gradual onset, 10 minutes to calm down     P3: L shoulder    P4: lateral aspect of L hip     S1: lateral and posterior aspect of R shank  Description: numbness   Aggravating factors: sitting   Easing factors: movement   Periodicity: 1x/week     Past medical history was reviewed with [facility]. Pt denies bowel/bladder changes, saddle anesthesia, and LUTHER LE N/T.  No past medical history on file.   No past surgical history on file. Patient endorses colon surgery 2 years ago for diverticulitis.    Medications were reviewed with [facility].   Current Outpatient Medications on File Prior to Visit   Medication Sig Dispense Refill    Cholecalciferol 50 MCG (2000 UT) Oral Cap Take by mouth daily.      naproxen 500 MG Oral Tab Take 1 tablet (500 mg total) by mouth 2 (two) times daily with meals.      cyclobenzaprine 5 MG Oral Tab Take 1-2 tablets (5-10 mg total) by mouth 3 (three) times daily as needed for Muscle spasms. 40 tablet 0    docusate sodium 100 MG Oral Cap Take 100 mg by mouth 2 (two) times daily.       No current facility-administered medications on file prior to visit.        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 Little interest or pleasure in doing thing? 0   2. Down, depressed, or hopeless? 0  Sleep: has difficulty falling asleep from laying on bed   CLOF: walking   PLOF: IND with ADLs and IADLs    Imaging/tests:    MRI SPINE LUMBAR (CPT=72148)    Result Date: 5/14/2024  CONCLUSION:   Mild chronic appearing T12, L1 and L2 compression deformities.  No acute fracture or malalignment.  No evidence of  significant central canal or foraminal narrowing throughout the lumbar spine.  Incidentally noted cholelithiasis.    Dictated by (CST): Jordin Marcum MD on 5/14/2024 at 7:49 AM     Finalized by (CST): Jordin Marcum MD on 5/14/2024 at 8:04 AM          MRI SACRUM/COCCYX SH(CPT=72195)    Result Date: 4/16/2024  CONCLUSION:   Nondisplaced fracture coccygeal tip which may be subacute.  Mild surrounding soft tissue edema.  Incidentally noted 2.6 cm mildly complex left ovarian cyst.  Follow-up pelvic ultrasound recommended.    Dictated by (CST): Jordin Marcum MD on 4/16/2024 at 9:21 AM     Finalized by (CST): Jordin Marcum MD on 4/16/2024 at 9:40 AM           ASSESSMENT  Stephanie presents to physical therapy evaluation with primary c/o low back and coccyx pain secondary to a sledding accident. Signs and symptoms are consistent with T12, L1, L2, and coccyx fractures along with possible lower lumbar discogenic pain with significant findings of global limitations in range of motion and hyperalgesia with PAIVM assessment. Contributing impairments include sedentary behavior and disturbed sleep. Functional deficits include but are not limited to sitting, bed mobility, standing, and household chores. Pt and PT discussed evaluation findings, pathology, POC and HEP.  Pt voiced understanding and performs HEP correctly without reported pain. Skilled Physical Therapy is medically necessary to address the above impairments and reach functional goals.   OBJECTIVE:   Vitals: VSS    Functional Movement Assessment: (p* denotes concordant pain, p** denotes discordant pain)  Twisting toward L side from supine: 5/10 p*, slow and effortful   Twisting toward R side from supine: less painful    AROM: (p* denotes familiar pain; OP denotes overpressure)  Lumbar   Flexion: p*, unable to bend 2/2 without knee flexion, fear   Extension: p* limited  Lateral flexion R WFL, p* with OP; L limited, p*  Rotation: R WFL, p* with OP; L WFL, p*  with OP  Extension quadrant: R limited, p*; L limited, p*     Passive Exam: (p* denotes concordant pain, p** denotes discordant pain)  Palpation: thoracolumbar paraspinals TTP B     Passive accessory joint range of motion:   T10 - S1  and T10/11 - L5/S1 UPAs: p* before resistance, unable to assess resistance 2/2 hyperalgesia  CPA coccyx: p*    Today’s Treatment and Response:   Date: 6/18/2024  TX#: 1/8 Date:                 TX#: 2/ Date:                 TX#: 3/ Date:                 TX#: 4/ Date:   Tx#: 5/   EX:  Pt education was provided on exam findings, treatment diagnosis, treatment plan, expectations, and prognosis. Pt was also provided recommendations for possible soreness after evaluation.  LTR R/L x10                              Patient was instructed in and issued a HEP for: LTR    Charges: PT Eval Low Complexity, EX 1      Total Timed Treatment: 8 min     Total Treatment Time: 8 min     Based on clinical rationale and outcome measures, this evaluation involved Low Complexity decision making due to 1-2 personal factors/comorbidities, 1-2 body structures involved/activity limitations, and stable symptoms.  PLAN OF CARE:    Goals: (to be met in 8 visits)   Patient will improve OLGA by at least 12.8 points to demonstrate minimally clinically important difference in patient reported outcome measure for low back pain.   Patient will be able to sit for at least 8 hours without coccyx pain to facilitate return to work.   Patient will be able to perform roll over in bed without low back pain to facilitate independence with bed mobility.   Patient will improve lumbar flexion to at least mid shins to facilitate independence with forward bending.     Frequency / Duration: Patient will be seen for 1-2x/week or a total of 8 visits over a 90 day period. Treatment will include: Manual Therapy, Neuromuscular Re-education, Therapeutic Activities, Therapeutic Exercise, Home Exercise Program instruction, and Modalities to  include: Electrical stimulation (attended)    Education or treatment limitation: None  Rehab Potential:good    Oswestry Disability Index Score  Score: 38 % (6/18/2024  2:40 PM)      Patient/Family/Caregiver was advised of these findings, precautions, and treatment options and has agreed to actively participate in planning and for this course of care.    Thank you for your referral. Please co-sign or sign and return this letter via fax as soon as possible to [phone]. If you have any questions, please contact me at Dept: [phone]    Sincerely,  Electronically signed by therapist: Radha Martínez PT    Physician's certification required: Yes  I certify the need for these services furnished under this plan of treatment and while under my care.    X___________________________________________________ Date____________________    Certification From: 6/18/2024  To:9/16/2024

## 2024-06-21 ENCOUNTER — OFFICE VISIT (OUTPATIENT)
Dept: PHYSICAL THERAPY | Age: 39
End: 2024-06-21
Attending: PHYSICIAN ASSISTANT

## 2024-06-21 PROCEDURE — 97112 NEUROMUSCULAR REEDUCATION: CPT

## 2024-06-21 PROCEDURE — 97140 MANUAL THERAPY 1/> REGIONS: CPT

## 2024-06-21 PROCEDURE — 97110 THERAPEUTIC EXERCISES: CPT

## 2024-06-21 NOTE — PROGRESS NOTES
Diagnosis:   Compression fracture of T12 vertebra with routine healing, subsequent encounter (S22.080D)  Closed compression fracture of L1 lumbar vertebra with routine healing, subsequent encounter (S32.010D)  Closed compression fracture of L2 lumbar vertebra with routine healing, subsequent encounter (S32.020D)  Closed fracture of coccyx, initial encounter ([facility]) (S32.2XXA)  Coccydynia (M53.3)       Referring Provider: [facility]  Date of Evaluation:    6/18/2024    Precautions:   Compression fx T12, L1, L2, coccyx Next MD visit:   none scheduled  Date of Surgery: n/a   Insurance Primary/Secondary: [employer] [place] / N/A     # Auth Visits: 2/8            Subjective: Patient states that she doesn't have any back pain today. Will have pain when rainy. Had some throbbing yesterday while deep cleaning her room. Patient states that she has had right leg paresthesias since the accident.     Pain: 0/10 low back, 5/10       Objective:   Bed mobility towards the L: 4/10 p*    Neuro exam:   Static cutaneous mechanodetection: intact throughout BLE   Myotomes: intact BLE  Reflexes: 2+ all LE     Slump: L L buttock pain with limited knee extension, no change with cervical flexion, more pain with dorsiflexion; R negative   SLR: R/L negative     Hip AROM:   Flexion: R p* abdomen, L p* abdomen  Extension: R/L p* buttock and back   IR: WFL B  ER: WFL B    Palpation: p* across lower abdomen    Hip MMT:   Glute max: at least 3-/5, unable to fully assess 2/2 p*    Assessment: Continued assessment say suggest possible discogenic pain along with continued nociception from chronic lumbar and sacral fractures. Utilized manual therapy and exercise for pain modulation.     Goals: (to be met in 8 visits)   Patient will improve LOGA by at least 12.8 points to demonstrate minimally clinically important difference in patient reported outcome measure for low back pain.   Patient will be able to sit for at least 8 hours without coccyx pain to facilitate  return to work.   Patient will be able to perform roll over in bed without low back pain to facilitate independence with bed mobility.   Patient will improve lumbar flexion to at least mid shins to facilitate independence with forward bending.     Plan: 1-2x/week for 6 visits   Date: 6/18/2024  TX#: 1/8 Date: 6/21/2024              TX#: 2/8 Date:                 TX#: 3/ Date:                 TX#: 4/ Date:   Tx#: 5/   EX:  Pt education was provided on exam findings, treatment diagnosis, treatment plan, expectations, and prognosis. Pt was also provided recommendations for possible soreness after evaluation.  LTR R/L x10 MT:   Palpation (see objective)  L sidelying lumbar rotation mobilization gr II x3 min // 3/10 p* bed mobility            EX:   Hip AROM/OP (see objective)  L sidelying thoracic rotations x10   Glute bridge isometrics           NMRE:   Neuro exam (see objective)  Slump (see objective)  SLR (see objective)  Hip MMT (see objective)                       HEP: LTR, L sidelying thoracic rotations, glute bridge isometrics     Charges: EX 1 (14 min), NMRE 2 (23 min), MT 1 (8 min)       Total Timed Treatment: 45 min  Total Treatment Time: 45 min

## 2024-06-26 ENCOUNTER — OFFICE VISIT (OUTPATIENT)
Dept: PHYSICAL THERAPY | Age: 39
End: 2024-06-26
Attending: PHYSICIAN ASSISTANT

## 2024-06-26 PROCEDURE — 97140 MANUAL THERAPY 1/> REGIONS: CPT

## 2024-06-26 PROCEDURE — 97110 THERAPEUTIC EXERCISES: CPT

## 2024-06-26 PROCEDURE — 97112 NEUROMUSCULAR REEDUCATION: CPT

## 2024-06-26 NOTE — PROGRESS NOTES
Diagnosis:   Compression fracture of T12 vertebra with routine healing, subsequent encounter (S22.080D)  Closed compression fracture of L1 lumbar vertebra with routine healing, subsequent encounter (S32.010D)  Closed compression fracture of L2 lumbar vertebra with routine healing, subsequent encounter (S32.020D)  Closed fracture of coccyx, initial encounter ([facility]) (S32.2XXA)  Coccydynia (M53.3)       Referring Provider: [facility]  Date of Evaluation:    6/18/2024    Precautions:   Compression fx T12, L1, L2, coccyx Next MD visit:   none scheduled  Date of Surgery: n/a   Insurance Primary/Secondary: [place] / N/A     # Auth Visits: 3/8            Subjective: Patient reports that she's been feeling better with abdominal pain and back pain. Has been feeling better with sacrum.     Pain: 2/10 low back, 3/10 sacrum       Objective:   Bed mobility towards the L: 3/10 p*    Lumbar flexion AROM/OP:   Flexion: limited mobility, lumbar p*, abdominal p* with OP; increased p* with lumbar flexion   Extension: limited mobility, lumbar p*    PAIVM assessment:    T11 - S1 hyperalgesia, mid/lower thoracic hypomobile     Assessment: Focused session on education to mitigate fear avoidance behavior as well as graded exposure to all planes lumbar range of motion.     Goals: (to be met in 8 visits)   Patient will improve OLGA by at least 12.8 points to demonstrate minimally clinically important difference in patient reported outcome measure for low back pain.   Patient will be able to sit for at least 8 hours without coccyx pain to facilitate return to work.   Patient will be able to perform roll over in bed without low back pain to facilitate independence with bed mobility.   Patient will improve lumbar flexion to at least mid shins to facilitate independence with forward bending.     Plan: 1-2x/week for 5 visits   Date: 6/18/2024  TX#: 1/8 Date: 6/21/2024              TX#: 2/8 Date: 6/26/2024           TX#: 3/8 Date:                  TX#: 4/ Date:   Tx#: 5/   EX:  Pt education was provided on exam findings, treatment diagnosis, treatment plan, expectations, and prognosis. Pt was also provided recommendations for possible soreness after evaluation.  LTR R/L x10 MT:   Palpation (see objective)  L sidelying lumbar rotation mobilization gr II x3 min // 3/10 p* bed mobility  MT:   PAIVM assessment (see objective)           EX:   Hip AROM/OP (see objective)  L sidelying thoracic rotations x10   Glute bridge isometrics EX:   Lumbar flexion (see objective)  Education on fear avoidance behavior, importance of remaining active, and bone healing times.   Double knee to chest 2x5         NMRE:   Neuro exam (see objective)  Slump (see objective)  SLR (see objective)  Hip MMT (see objective) NMRE:   Cat/cow 2x5 with tactile facilitation   Seated lumbar flexion 2x5                     HEP: LTR, L sidelying thoracic rotations, glute bridge isometrics, seated lumbar flexion, cat/cow, double knee to chest    Charges: EX 2 (23 min), NMRE 1 (14 min), MT 1 (8 min)       Total Timed Treatment: 45 min  Total Treatment Time: 45 min

## 2024-07-01 ENCOUNTER — OFFICE VISIT (OUTPATIENT)
Dept: PHYSICAL MEDICINE AND REHAB | Facility: CLINIC | Age: 39
End: 2024-07-01
Payer: COMMERCIAL

## 2024-07-01 DIAGNOSIS — M53.3 TRAUMATIC COCCYDYNIA: Primary | ICD-10-CM

## 2024-07-01 PROCEDURE — 99243 OFF/OP CNSLTJ NEW/EST LOW 30: CPT | Performed by: PHYSICAL MEDICINE & REHABILITATION

## 2024-07-01 NOTE — PROGRESS NOTES
[facility]  Progress Note    CHIEF COMPLAINT:    Chief Complaint   Patient presents with    New Patient     New right hand dominant patient presents for low back/coccyx pain. Pain has been presents since January 13, 2024. Patient admits she had a sledding accident. Pain 4/10. Denies N/T. Denies weakness. No pain meds. No tx done. Patient states she in PT for her low back with relief. XR 4/15/24. MRI 5/13/24.        History of Present Illness:  Stephanie Hanks is a 39 year old female who is being seen in consultation at the request of June Bradford.  6 months ago the patient sustained multiple lumbar compression fractures after an injury to bargaining off of a jump and landing with axial load into her spine.  It was discovered that she had multiple thoracolumbar compression fractures as well as a transverse coccygeal region fracture.  She is very stiff and has started physical therapy for her low back.  She had extensive imaging.  No kyphoplasty, no interventions.  She is here primarily for the coccyx pain.  She denies exacerbation with bowel movements, urination or sexual activity.  Pain only produced with sitting longer than 2 hours.      PAST MEDICAL HISTORY:  No past medical history on file.    SURGICAL HISTORY:  No past surgical history on file.    SOCIAL HISTORY:   Social History     Occupational History    Not on file   Tobacco Use    Smoking status: Never    Smokeless tobacco: Never   Substance and Sexual Activity    Alcohol use: Not Currently    Drug use: Never    Sexual activity: Not on file       CURRENT MEDICATIONS:   Current Outpatient Medications   Medication Sig Dispense Refill    Cholecalciferol 50 MCG (2000 UT) Oral Cap Take by mouth daily.      naproxen 500 MG Oral Tab Take 1 tablet (500 mg total) by mouth 2 (two) times daily with meals.      cyclobenzaprine 5 MG Oral Tab Take 1-2 tablets (5-10 mg total) by mouth 3 (three) times daily as needed for  Muscle spasms. 40 tablet 0    docusate sodium 100 MG Oral Cap Take 100 mg by mouth 2 (two) times daily.         ALLERGIES:   No Known Allergies    REVIEW OF SYSTEMS:   No patient-reported data collected this visit.            PHYSICAL EXAM:   There were no vitals taken for this visit.    There is no height or weight on file to calculate BMI.      General: No immediate distress  Head: Normocephalic/ Atraumatic  Extremities: No lower extremity edema bilaterally. Peripheral pulses intact.   Spine: Limited lumbar ROM in all directions, no percussion tenderness, tender to palpation over the paraspinals and midline over the coccyx and pericoccygeal musculature  Hips: full and painfree ROM   Neuro:   Cognition: alert & oriented x 3, attentive, able to follow 2 step commands, comprehention intact, spontaneous speech intact  Strength: Lower extremities have 5/5 strength  Sensation: Normal lower extremities  Reflexes: Normal lower extremities  SLR: neg    Data    Radiology Imaging:  I personally reviewed a MRI of the sacrum from April 2024 showing increased T2 signal along the distal sacrum      ASSESSMENT AND PLAN:  1. Traumatic coccydynia  If the injury occurred in January, the sacrum and coccyx are healed.  She probably has pelvic floor dysfunction.  I added pelvic floor physical therapy along with her low back physical therapy.  Return in 6 to 8 weeks.  - PHYSICAL THERAPY - INTERNAL          The patient was in agreement with the assessment and plan.  All questions were answered.        Tab Lilly MD  Physical Medicine and Rehabilitation/Sports Medicine  [facility]

## 2024-07-05 ENCOUNTER — OFFICE VISIT (OUTPATIENT)
Dept: PHYSICAL THERAPY | Age: 39
End: 2024-07-05
Attending: PHYSICIAN ASSISTANT
Payer: COMMERCIAL

## 2024-07-05 PROCEDURE — 97140 MANUAL THERAPY 1/> REGIONS: CPT

## 2024-07-05 PROCEDURE — 97112 NEUROMUSCULAR REEDUCATION: CPT

## 2024-07-05 PROCEDURE — 97110 THERAPEUTIC EXERCISES: CPT

## 2024-07-05 NOTE — PROGRESS NOTES
Diagnosis:   Compression fracture of T12 vertebra with routine healing, subsequent encounter (S22.080D)  Closed compression fracture of L1 lumbar vertebra with routine healing, subsequent encounter (S32.010D)  Closed compression fracture of L2 lumbar vertebra with routine healing, subsequent encounter (S32.020D)  Closed fracture of coccyx, initial encounter ([facility]) (S32.2XXA)  Coccydynia (M53.3)       Referring Provider: [facility]  Date of Evaluation:    6/18/2024    Precautions:   Compression fx T12, L1, L2, coccyx Next MD visit:   none scheduled  Date of Surgery: n/a   Insurance Primary/Secondary: [employer] / N/A     # Auth Visits: 4/8            Subjective: Patient states that she saw the physiatrist and was given muscle relaxant. Has been doing exercises 5-6x/day.     Pain: 0/10 low back and sacrum       Objective:   Bed mobility towards the L: 0/10 p*    Lumbar flexion AROM/OP:   Flexion: to shins, compensatory knee flexion  Extension: limited, 1-2/10 p*     Lumbosacral PAIVMs:   : L1-co3 p*, reduction in hyperalgesia, muscle spasm, hypomobile  UPAs: R/L p* L, pain free R muscle spasms, hypomobile     Assessment: Patient presenting with improvement in hyperalgesia with passive exam along with significant improvement in lumbar flexion and extension active range of motion. Nevertheless, patient continues to present with difficulties with bed mobility due to poor recruitment of abdominal muscles with significant compensatory activation of superficial neck muscles.     Goals: (to be met in 8 visits)   Patient will improve OLGA by at least 12.8 points to demonstrate minimally clinically important difference in patient reported outcome measure for low back pain.   Patient will be able to sit for at least 8 hours without coccyx pain to facilitate return to work.   Patient will be able to perform roll over in bed without low back pain to facilitate independence with bed mobility.   Patient will improve lumbar flexion  18-Aug-2019 to at least mid shins to facilitate independence with forward bending.     Plan: 1-2x/week for 4 visits   Date: 6/18/2024  TX#: 1/8 Date: 6/21/2024              TX#: 2/8 Date: 6/26/2024           TX#: 3/8 Date: 7/5/2024             TX#: 4/8 Date:   Tx#: 5/   EX:  Pt education was provided on exam findings, treatment diagnosis, treatment plan, expectations, and prognosis. Pt was also provided recommendations for possible soreness after evaluation.  LTR R/L x10 MT:   Palpation (see objective)  L sidelying lumbar rotation mobilization gr II x3 min // 3/10 p* bed mobility  MT:   PAIVM assessment (see objective)   MT:   PAIVM assessment (see objective)  L4 CPA gr III- x3 min // lumbar extension 0/10 p*       EX:   Hip AROM/OP (see objective)  L sidelying thoracic rotations x10   Glute bridge isometrics EX:   Lumbar flexion (see objective)  Education on fear avoidance behavior, importance of remaining active, and bone healing times.   Double knee to chest 2x5 EX:   Lumbar AROM (see objective)  Standing lumbar extension x10  Glute bridge x3 sets to fatigue          NMRE:   Neuro exam (see objective)  Slump (see objective)  SLR (see objective)  Hip MMT (see objective) NMRE:   Cat/cow 2x5 with tactile facilitation   Seated lumbar flexion 2x5 NMRE:   Mini crunch with/without UE support x5 - discontinued 2/2 difficulty and compensations  Crunch with stability ball 10x10s   R/L alternating knee to chest with resistance x10                   HEP: LTR, L sidelying thoracic rotations, glute bridge isometrics, seated lumbar flexion, cat/cow, double knee to chest, lumbar extension    Charges: EX 1 (14 min), NMRE 2 (23 min), MT 1 (8 min)       Total Timed Treatment: 45 min  Total Treatment Time: 45 min

## 2024-07-09 ENCOUNTER — OFFICE VISIT (OUTPATIENT)
Dept: PHYSICAL THERAPY | Age: 39
End: 2024-07-09
Attending: PHYSICIAN ASSISTANT
Payer: COMMERCIAL

## 2024-07-09 PROCEDURE — 97110 THERAPEUTIC EXERCISES: CPT

## 2024-07-09 NOTE — PROGRESS NOTES
Diagnosis:   Compression fracture of T12 vertebra with routine healing, subsequent encounter (S22.080D)  Closed compression fracture of L1 lumbar vertebra with routine healing, subsequent encounter (S32.010D)  Closed compression fracture of L2 lumbar vertebra with routine healing, subsequent encounter (S32.020D)  Closed fracture of coccyx, initial encounter ([facility]) (S32.2XXA)  Coccydynia (M53.3)       Referring Provider: Sanchez  Date of Evaluation:    2024    Precautions:   Compression fx T12, L1, L2, coccyx Next MD visit:   none scheduled  Date of Surgery: n/a   Insurance Primary/Secondary: [employer] [place] P[place] / N/A     # Auth Visits:             Subjective: Patient states that she is feeling better but still feels heaviness in back after doing the exercises.     Pain: 0/10 low back and sacrum       Objective:     Standing to put on sock: 1/10 p*  Bed mobility towards the L: 0/10 p*  Liftin lbs from ground pain free, 10 lbs from ground pain free, 6 inch step 2/10 p*, 4 inch step pain free     Lumbar flexion AROM/OP:   Flexion: to shins, pulling  Extension: pulling  R/L lateral flexion: limited, 1/10 p*, L lateral flexion abdominal p*  R/L rotation: WFL, 1/10 p*    Assessment: Patient demonstrating significant progress as now able to perform range of motion in all directions with minimal to no pain. Patient also now able to tolerate progress loading with functional exercises without pain provocation. Patient progressing appropriately through plan of care.     Goals: (to be met in 8 visits)   Patient will improve OLGA by at least 12.8 points to demonstrate minimally clinically important difference in patient reported outcome measure for low back pain.   Patient will be able to sit for at least 8 hours without coccyx pain to facilitate return to work.   Patient will be able to perform roll over in bed without low back pain to facilitate independence with bed mobility.   Patient will improve lumbar flexion to at least  mid shins to facilitate independence with forward bending.     Plan: 1-2x/week for 3 visits   Date: 6/18/2024  TX#: 1/8 Date: 6/21/2024              TX#: 2/8 Date: 6/26/2024           TX#: 3/8 Date: 7/5/2024             TX#: 4/8 Date: 7/9/2024  Tx#: 5/8   EX:  Pt education was provided on exam findings, treatment diagnosis, treatment plan, expectations, and prognosis. Pt was also provided recommendations for possible soreness after evaluation.  LTR R/L x10 MT:   Palpation (see objective)  L sidelying lumbar rotation mobilization gr II x3 min // 3/10 p* bed mobility  MT:   PAIVM assessment (see objective)   MT:   PAIVM assessment (see objective)  L4 CPA gr III- x3 min // lumbar extension 0/10 p* EX:   Functional movement assessment (see objective)   Lumbar AROM (see objective)  Lifting 4 inch step x10  Squat with 10 lbs 2x10  Portuguese deadlift 2x8  Forward/backward lunges x2 laps   Glute bridge with arms crossed to fatigue      EX:   Hip AROM/OP (see objective)  L sidelying thoracic rotations x10   Glute bridge isometrics EX:   Lumbar flexion (see objective)  Education on fear avoidance behavior, importance of remaining active, and bone healing times.   Double knee to chest 2x5 EX:   Lumbar AROM (see objective)  Standing lumbar extension x10  Glute bridge x3 sets to fatigue          NMRE:   Neuro exam (see objective)  Slump (see objective)  SLR (see objective)  Hip MMT (see objective) NMRE:   Cat/cow 2x5 with tactile facilitation   Seated lumbar flexion 2x5 NMRE:   Mini crunch with/without UE support x5 - discontinued 2/2 difficulty and compensations  Crunch with stability ball 10x10s   R/L alternating knee to chest with resistance x10                   HEP: LTR, L sidelying thoracic rotations, glute bridge isometrics, seated lumbar flexion, cat/cow, double knee to chest, lumbar extension, weighted squat, weighted Austrian deadlift, forward/reverse lunges     Charges: EX 3 (38 min)      Total Timed Treatment: 38  min  Total Treatment Time: 38 min

## 2024-07-11 ENCOUNTER — OFFICE VISIT (OUTPATIENT)
Dept: PHYSICAL THERAPY | Age: 39
End: 2024-07-11
Attending: PHYSICIAN ASSISTANT
Payer: COMMERCIAL

## 2024-07-11 PROCEDURE — 97110 THERAPEUTIC EXERCISES: CPT

## 2024-07-11 PROCEDURE — 97140 MANUAL THERAPY 1/> REGIONS: CPT

## 2024-07-11 NOTE — PROGRESS NOTES
Diagnosis:   Compression fracture of T12 vertebra with routine healing, subsequent encounter (S22.080D)  Closed compression fracture of L1 lumbar vertebra with routine healing, subsequent encounter (S32.010D)  Closed compression fracture of L2 lumbar vertebra with routine healing, subsequent encounter (S32.020D)  Closed fracture of coccyx, initial encounter ([facility]) (S32.2XXA)  Coccydynia (M53.3)       Referring Provider: Sanchez  Date of Evaluation:    6/18/2024    Precautions:   Compression fx T12, L1, L2, coccyx Next MD visit:   none scheduled  Date of Surgery: n/a   Insurance Primary/Secondary: [employer] [place] / N/A     # Auth Visits: 6/8            Subjective: Patient states that she has no back pain but still getting coccyx pain when sitting for a several hours.     Pain: 2/10 low back and sacrum       Objective:     Sacral PAIVMs: p* S3, S4, S5  Lumbar PAIVMs: hyperalgesia throughout with UPAs and , L UPAs more sensitive than R  Back extensor MMT: 2/5    Assessment: Patient presenting with flare-up in coccyx pain due to prolonged sitting during work day related to soreness from previous session. Patient remains hyperalgesic throughout the lumbosacral spine along with sensation of heaviness in lumbar spine potentially related to decreased muscle recruitment for stabilization during functional mobility. Nevertheless, overall patient progressing appropriately and was cleared to return to work with recommendations to incorporate frequent standing breaks.     Goals: (to be met in 8 visits)   Patient will improve OLGA by at least 12.8 points to demonstrate minimally clinically important difference in patient reported outcome measure for low back pain.   Patient will be able to sit for at least 8 hours without coccyx pain to facilitate return to work.   Patient will be able to perform roll over in bed without low back pain to facilitate independence with bed mobility.   Patient will improve lumbar flexion to at least mid  shins to facilitate independence with forward bending.     Plan: 1-2x/week for 2 visits   Date: 6/18/2024  TX#: 1/8 Date: 6/21/2024              TX#: 2/8 Date: 6/26/2024           TX#: 3/8 Date: 7/5/2024             TX#: 4/8 Date: 7/9/2024  Tx#: 5/8 Date: 7/11/2024  TX#: 6/8   EX:  Pt education was provided on exam findings, treatment diagnosis, treatment plan, expectations, and prognosis. Pt was also provided recommendations for possible soreness after evaluation.  LTR R/L x10 MT:   Palpation (see objective)  L sidelying lumbar rotation mobilization gr II x3 min // 3/10 p* bed mobility  MT:   PAIVM assessment (see objective)   MT:   PAIVM assessment (see objective)  L4 CPA gr III- x3 min // lumbar extension 0/10 p* EX:   Functional movement assessment (see objective)   Lumbar AROM (see objective)  Lifting 4 inch step x10  Squat with 10 lbs 2x10  Togolese deadlift 2x8  Forward/backward lunges x2 laps   Glute bridge with arms crossed to fatigue  MT:   Sacral PAIVMs (see objective)  CPA S3 gr III x3 min // 3-4/10 p* sacrum while sitting      EX:   Hip AROM/OP (see objective)  L sidelying thoracic rotations x10   Glute bridge isometrics EX:   Lumbar flexion (see objective)  Education on fear avoidance behavior, importance of remaining active, and bone healing times.   Double knee to chest 2x5 EX:   Lumbar AROM (see objective)  Standing lumbar extension x10  Glute bridge x3 sets to fatigue      EX:   Prone glute squeeze + R/L hip extension x2 sets to fatigue // no change in p* with sitting  Superman to fatigue - discontinued 2/2 p*     NMRE:   Neuro exam (see objective)  Slump (see objective)  SLR (see objective)  Hip MMT (see objective) NMRE:   Cat/cow 2x5 with tactile facilitation   Seated lumbar flexion 2x5 NMRE:   Mini crunch with/without UE support x5 - discontinued 2/2 difficulty and compensations  Crunch with stability ball 10x10s   R/L alternating knee to chest with resistance x10                     HEP: LTR, L  sidelying thoracic rotations, glute bridge isometrics, seated lumbar flexion, cat/cow, double knee to chest, lumbar extension, weighted squat, weighted Paraguayan deadlift, forward/reverse lunges     Charges: EX 1 (15 min), MT 2 (30 min)      Total Timed Treatment: 45 min  Total Treatment Time: 45 min

## 2024-07-16 ENCOUNTER — OFFICE VISIT (OUTPATIENT)
Dept: PHYSICAL THERAPY | Age: 39
End: 2024-07-16
Attending: PHYSICIAN ASSISTANT
Payer: COMMERCIAL

## 2024-07-16 PROCEDURE — 97110 THERAPEUTIC EXERCISES: CPT

## 2024-07-16 PROCEDURE — 97112 NEUROMUSCULAR REEDUCATION: CPT

## 2024-07-16 PROCEDURE — 97140 MANUAL THERAPY 1/> REGIONS: CPT

## 2024-07-16 NOTE — PROGRESS NOTES
Diagnosis:   Compression fracture of T12 vertebra with routine healing, subsequent encounter (S22.080D)  Closed compression fracture of L1 lumbar vertebra with routine healing, subsequent encounter (S32.010D)  Closed compression fracture of L2 lumbar vertebra with routine healing, subsequent encounter (S32.020D)  Closed fracture of coccyx, initial encounter ([facility]) (S32.2XXA)  Coccydynia (M53.3)       Referring Provider: Sanchez  Date of Evaluation:    6/18/2024    Precautions:   Compression fx T12, L1, L2, coccyx Next MD visit:   none scheduled  Date of Surgery: n/a   Insurance Primary/Secondary: [employer] / N/A     # Auth Visits: 7/8            Subjective: Patient reports that things are feeling good. Still having some pain with the sitting.     Pain: 2/10 p* low back, 4/10 p* sacrum       Objective:     Sacral PAIVMs: p* S2, S3  Lumbar PAIVMs: hyperalgesia throughout with UPAs and  but not familiar p*    Assessment: Patient with continued hyperalgesia throughout the lumbar spine. However input is no longer symptom provocative, but rather related to mechanical stimulus of pressure alone. Patient nevertheless lacks the core muscle recruitment required to stabilize the lower extremities and pelvis during higher level core exercises.     Goals: (to be met in 8 visits)   Patient will improve OLGA by at least 12.8 points to demonstrate minimally clinically important difference in patient reported outcome measure for low back pain.   Patient will be able to sit for at least 8 hours without coccyx pain to facilitate return to work.   Patient will be able to perform roll over in bed without low back pain to facilitate independence with bed mobility.   Patient will improve lumbar flexion to at least mid shins to facilitate independence with forward bending.     Plan: 1-2x/week for 1 visits   Date: 6/18/2024  TX#: 1/8 Date: 6/21/2024              TX#: 2/8 Date: 6/26/2024           TX#: 3/8 Date: 7/5/2024             TX#:  4/8 Date: 7/9/2024  Tx#: 5/8 Date: 7/11/2024  TX#: 6/8 Date: 7/16/2024  TX#: 7/8   EX:  Pt education was provided on exam findings, treatment diagnosis, treatment plan, expectations, and prognosis. Pt was also provided recommendations for possible soreness after evaluation.  LTR R/L x10 MT:   Palpation (see objective)  L sidelying lumbar rotation mobilization gr II x3 min // 3/10 p* bed mobility  MT:   PAIVM assessment (see objective)   MT:   PAIVM assessment (see objective)  L4 CPA gr III- x3 min // lumbar extension 0/10 p* EX:   Functional movement assessment (see objective)   Lumbar AROM (see objective)  Lifting 4 inch step x10  Squat with 10 lbs 2x10  Wallisian deadlift 2x8  Forward/backward lunges x2 laps   Glute bridge with arms crossed to fatigue  MT:   Sacral PAIVMs (see objective)  CPA S3 gr III x3 min // 3-4/10 p* sacrum while sitting  MT:   Lumbosacral PAIVM exam (see objective)  CPA S2 gr III- x5 min// 1/10 p* sacrum while sitting      EX:   Hip AROM/OP (see objective)  L sidelying thoracic rotations x10   Glute bridge isometrics EX:   Lumbar flexion (see objective)  Education on fear avoidance behavior, importance of remaining active, and bone healing times.   Double knee to chest 2x5 EX:   Lumbar AROM (see objective)  Standing lumbar extension x10  Glute bridge x3 sets to fatigue      EX:   Prone glute squeeze + R/L hip extension x2 sets to fatigue // no change in p* with sitting  Superman to fatigue - discontinued 2/2 p* EX:  Superman with glute squeeze 2x10  Glute bridge x30         NMRE:   Neuro exam (see objective)  Slump (see objective)  SLR (see objective)  Hip MMT (see objective) NMRE:   Cat/cow 2x5 with tactile facilitation   Seated lumbar flexion 2x5 NMRE:   Mini crunch with/without UE support x5 - discontinued 2/2 difficulty and compensations  Crunch with stability ball 10x10s   R/L alternating knee to chest with resistance x10    NMRE:   Bird dog with dowel for external cueing 2x10  Attempted  deadbug but patient unable to assume 90/90 position  Alternating knee lift - discontinued 2/2 ease  Alternating knee extension - discontinued 2/2 ease                     HEP: LTR, L sidelying thoracic rotations, glute bridge isometrics, seated lumbar flexion, cat/cow, double knee to chest, lumbar extension, weighted squat, weighted Malian deadlift, forward/reverse lunges, super man, bird dog     Charges: EX 1 (14 min), MT 2 (23 min), NMRE 1 (8 min)      Total Timed Treatment: 45 min  Total Treatment Time: 45 min

## 2024-07-18 ENCOUNTER — OFFICE VISIT (OUTPATIENT)
Dept: PHYSICAL THERAPY | Age: 39
End: 2024-07-18
Attending: PHYSICIAN ASSISTANT
Payer: COMMERCIAL

## 2024-07-18 PROCEDURE — 97110 THERAPEUTIC EXERCISES: CPT

## 2024-07-18 NOTE — PROGRESS NOTES
Diagnosis:   Compression fracture of T12 vertebra with routine healing, subsequent encounter (S22.080D)  Closed compression fracture of L1 lumbar vertebra with routine healing, subsequent encounter (S32.010D)  Closed compression fracture of L2 lumbar vertebra with routine healing, subsequent encounter (S32.020D)  Closed fracture of coccyx, initial encounter ([facility]) (S32.2XXA)  Coccydynia (M53.3)       Referring Provider: [facility]  Date of Evaluation:    6/18/2024    Precautions:   Compression fx T12, L1, L2, coccyx Next MD visit:   none scheduled  Date of Surgery: n/a   Insurance Primary/Secondary: [employer] / N/A     # Auth Visits: 8/8         Progress Summary  Pt has attended 8 visits in Physical Therapy.        Subjective: Patient reports that she's feeling 90% improved. Still feels limited in sitting. Takes break every hour.    Pain: 0/10 p* low back, 1/10 p* sacrum       Objective:     Functional movement assessment:   Lumbar flexion: to min shins, 1/10 p*   Bed mobility towards the L: 1/10 p*    Core muscle endurance:   Front plank: 25s  Side plank: R 3s on feet, 35s, L 2s on feet, 13s on knee    Assessment: Patient demonstrating significant improvement in pain and functional mobility. Patient will continue to benefit from physical therapy to address core muscle strength and endurance deficits to support lumbar spine with higher level functional activities.     Goals: (to be met in 8 visits)   Patient will improve OLGA by at least 12.8 points to demonstrate minimally clinically important difference in patient reported outcome measure for low back pain. MET 7/18/2024  Patient will be able to sit for at least 8 hours without coccyx pain to facilitate return to work. Progressing   Patient will be able to perform roll over in bed without low back pain to facilitate independence with bed mobility. Progressing  Patient will improve lumbar flexion to at least mid shins to facilitate independence with forward bending.  MET 7/18/2024    Plan: 1-2x/week for 2 visits   Date: 7/18/2024  TX#: 8/8        EX:   Palloff press R/L with red tubing to fatigue  Palloff press + R/L rotation with red tubing x2 sets to fatigue   Palloff press + R/L up/down with red tubing x1 set to fatigue  Functional movement assessment (see objective)  Core muscle endurance (see objective)                                          HEP: LTR, L sidelying thoracic rotations, glute bridge isometrics, seated lumbar flexion, cat/cow, double knee to chest, lumbar extension, weighted squat, weighted Slovenian deadlift, forward/reverse lunges, super man, bird dog, front plank, side plank, palloff press R/L rotation and up/down    Charges: EX 3 (45 min)   Total Timed Treatment: 45 min  Total Treatment Time: 45 min  Oswestry Disability Index Score  Score: 38 % (6/18/2024  2:40 PM)    Post Oswestry Disability Index Score  Post Score: 20 % (7/18/2024  3:23 PM)    18 % improvement    Plan: Continue skilled Physical Therapy 1-2x/week or a total of 2 visits over a 90 day period. Treatment will include: manual therapy, therapeutic exercise, neuromuscular re-education, therapeutic activities        Patient/Family/Caregiver was advised of these findings, precautions, and treatment options and has agreed to actively participate in planning and for this course of care.    Thank you for your referral. If you have any questions, please contact me at Dept: [phone].    Sincerely,  Electronically signed by therapist: Radha Martínez PT     Physician's certification required:  Yes  Please co-sign or sign and return this letter via fax as soon as possible to [phone].   I certify the need for these services furnished under this plan of treatment and while under my care.    X___________________________________________________ Date____________________    Certification From: 7/18/2024  To:10/16/2024

## 2024-07-22 ENCOUNTER — OFFICE VISIT (OUTPATIENT)
Dept: PHYSICAL THERAPY | Age: 39
End: 2024-07-22
Attending: PHYSICIAN ASSISTANT
Payer: COMMERCIAL

## 2024-07-22 PROCEDURE — 97112 NEUROMUSCULAR REEDUCATION: CPT

## 2024-07-22 PROCEDURE — 97110 THERAPEUTIC EXERCISES: CPT

## 2024-07-22 NOTE — PROGRESS NOTES
Diagnosis:   Compression fracture of T12 vertebra with routine healing, subsequent encounter (S22.080D)  Closed compression fracture of L1 lumbar vertebra with routine healing, subsequent encounter (S32.010D)  Closed compression fracture of L2 lumbar vertebra with routine healing, subsequent encounter (S32.020D)  Closed fracture of coccyx, initial encounter ([facility]) (S32.2XXA)  Coccydynia (M53.3)       Referring Provider: [facility]  Date of Evaluation:    6/18/2024    Precautions:   Compression fx T12, L1, L2, coccyx Next MD visit:   none scheduled  Date of Surgery: n/a   Insurance Primary/Secondary: [employer] [place] / N/A     # Auth Visits: 9/10 *Updated POC to 10 visits           Subjective: Patient reports that her back is good but some pain in sacrum with sitting. Thinks she may have overdone it with her sacrum yesterday because drove to [place]. Patient states that she was sore after the band exercise.     Pain: 3/10 p* sacrum       Objective:     Sitting: 3/10 p*    Assessment: Patient with flare-up in sacral pain following long drive yesterday but with good response to exercise today pain free at the end of session potentially related to exercise induced hypoalgesia.     Goals: (to be met in 8 visits)   Patient will improve OLGA by at least 12.8 points to demonstrate minimally clinically important difference in patient reported outcome measure for low back pain. MET 7/18/2024  Patient will be able to sit for at least 8 hours without coccyx pain to facilitate return to work. Progressing   Patient will be able to perform roll over in bed without low back pain to facilitate independence with bed mobility. Progressing  Patient will improve lumbar flexion to at least mid shins to facilitate independence with forward bending. MET 7/18/2024    Plan: 1-2x/week for 1 visit  Date: 7/18/2024  TX#: 8/8 Date: 7/22/2024  TX#: 9/10  *Updated POC to 10 visits       EX:   Palloff press R/L with red tubing to fatigue  Palloff press + R/L  rotation with red tubing x2 sets to fatigue   Palloff press + R/L up/down with red tubing x1 set to fatigue  Functional movement assessment (see objective)  Core muscle endurance (see objective)  EX:   Sacrum self mobilization with tennis ball x5 upper sacrum, middle sacrum, and lower sacrum, x10 upper sacrum, middle sacrum, and lower sacrum // 2/10 p* sitting   R/L sidelying thoracolumbar rotation x10   R/L LTR x10            NMRE:   Rib stack + PPT + TA activation + R/L alternating marches x12  Rib stack + PPT + TA activation + knees to chest with adductor squeeze x10   Rib stack + PPT + TA activation + knees to chest with adductor squeeze + R/L alternating arms 2x10  Glute bridge + adductor squeeze to fatigue  // pain free sacrum   Front plank x25s                                 HEP: LTR, L sidelying thoracic rotations, glute bridge isometrics, seated lumbar flexion, cat/cow, double knee to chest, lumbar extension, weighted squat, weighted Macedonian deadlift, forward/reverse lunges, super man, bird dog, front plank, side plank, palloff press R/L rotation and up/down    Charges: EX 1 (10 min), NMRE 2 (28 min)   Total Timed Treatment: 38 min  Total Treatment Time: 38 min

## 2024-07-24 ENCOUNTER — OFFICE VISIT (OUTPATIENT)
Dept: PHYSICAL THERAPY | Age: 39
End: 2024-07-24
Attending: PHYSICIAN ASSISTANT
Payer: COMMERCIAL

## 2024-07-24 PROCEDURE — 97112 NEUROMUSCULAR REEDUCATION: CPT

## 2024-07-24 PROCEDURE — 97530 THERAPEUTIC ACTIVITIES: CPT

## 2024-07-24 PROCEDURE — 97110 THERAPEUTIC EXERCISES: CPT

## 2024-07-24 NOTE — PROGRESS NOTES
Diagnosis:   Compression fracture of T12 vertebra with routine healing, subsequent encounter (S22.080D)  Closed compression fracture of L1 lumbar vertebra with routine healing, subsequent encounter (S32.010D)  Closed compression fracture of L2 lumbar vertebra with routine healing, subsequent encounter (S32.020D)  Closed fracture of coccyx, initial encounter ([facility]) (S32.2XXA)  Coccydynia (M53.3)       Referring Provider: [facility]  Date of Evaluation:    2024    Precautions:   Compression fx T12, L1, L2, coccyx Next MD visit:   none scheduled  Date of Surgery: n/a   Insurance Primary/Secondary: [employer] [place] / N/A     # Auth Visits: 10/10 *Updated POC to 10 visits         Discharge Summary  Pt has attended 10 visits in Physical Therapy.       Subjective: Patient states that she is sore from last workout. Feels that she is 90%.     Pain: 4/10 p* sacrum       Objective:     Sittin/10 p* sacrum    Assessment: Patient has met all goals with the exception of sitting for prolonged periods due to coccydynia. Home exercise program was reviewed and updated. Patient educated to return to physical therapy should symptoms change or worsen. Patient is now discharged from physical therapy.     Goals: (to be met in 8 visits)   Patient will improve OLGA by at least 12.8 points to demonstrate minimally clinically important difference in patient reported outcome measure for low back pain. MET 2024  Patient will be able to sit for at least 8 hours without coccyx pain to facilitate return to work. NOT MET  Patient will be able to perform roll over in bed without low back pain to facilitate independence with bed mobility. MET 2024  Patient will improve lumbar flexion to at least mid shins to facilitate independence with forward bending. MET 2024    Plan: discharge   Date: 2024  TX#: 8 Date: 2024  TX#: 9/10  *Updated POC to 10 visits Date: 2024  TX#: 10/10         EX:   Palloff press R/L with red tubing  to fatigue  Palloff press + R/L rotation with red tubing x2 sets to fatigue   Palloff press + R/L up/down with red tubing x1 set to fatigue  Functional movement assessment (see objective)  Core muscle endurance (see objective)  EX:   Sacrum self mobilization with tennis ball x5 upper sacrum, middle sacrum, and lower sacrum, x10 upper sacrum, middle sacrum, and lower sacrum // 2/10 p* sitting   R/L sidelying thoracolumbar rotation x10   R/L LTR x10    EX:   LTR x10   R/L sidelying thoracolumbar rotation x10  Cat/cow x10  Reviewed double knee to chest stretch  Seated lumbar flexion x5  R/L single leg bridge x2 sets to fatigue   Superman x10        NMRE:   Rib stack + PPT + TA activation + R/L alternating marches x12  Rib stack + PPT + TA activation + knees to chest with adductor squeeze x10   Rib stack + PPT + TA activation + knees to chest with adductor squeeze + R/L alternating arms 2x10  Glute bridge + adductor squeeze to fatigue  // pain free sacrum   Front plank x25s   NMRE:  R/L palloff press + rotation x10 with 7.5 lbs  R/L palloff press + shoulder flexion x10 with 7.5 lbs  R/L side plank to fatigue   Bird dog x10         TA:   Squat with 10 lbs x10  Icelandic deadlift with 20 lbs x10   Forward lunge x1 lap/ reverse lunges x2 laps                     HEP: LTR, L sidelying thoracic rotations, single leg bridge, seated lumbar flexion, cat/cow, double knee to chest, lumbar extension, weighted squat, weighted Maori deadlift, forward/reverse lunges, super man, bird dog, front plank, side plank, palloff press R/L rotation and up/down    Charges: EX 2 (23 min), NMRE 1 (9 min), TA 1 (8 min)   Total Timed Treatment: 40 min  Total Treatment Time: 40 min  Oswestry Disability Index Score  Score: 38 % (6/18/2024  2:40 PM)    Post Oswestry Disability Index Score  Post Score: 12 % (7/24/2024  3:54 PM)    26 % improvement    Plan: discharge     Patient/Family/Caregiver was advised of these findings, precautions, and  treatment options and has agreed to actively participate in planning and for this course of care.    Thank you for your referral. If you have any questions, please contact me at Dept: [phone].    Sincerely,  Electronically signed by therapist: Radha Martínez PT     Physician's certification required:  No  Please co-sign or sign and return this letter via fax as soon as possible to [phone].   I certify the need for these services furnished under this plan of treatment and while under my care.    X___________________________________________________ Date____________________    Certification From: 7/24/2024  To:10/22/2024

## 2025-02-26 SDOH — ECONOMIC STABILITY: FOOD INSECURITY: WITHIN THE PAST 12 MONTHS, THE FOOD YOU BOUGHT JUST DIDN'T LAST AND YOU DIDN'T HAVE MONEY TO GET MORE.: NEVER TRUE

## 2025-02-26 SDOH — ECONOMIC STABILITY: GENERAL: WOULD YOU LIKE HELP WITH ANY OF THE FOLLOWING NEEDS?: I DON'T WANT HELP WITH ANY OF THESE

## 2025-02-26 SDOH — ECONOMIC STABILITY: HOUSING INSECURITY: WHAT IS YOUR LIVING SITUATION TODAY?: I HAVE A STEADY PLACE TO LIVE

## 2025-02-26 SDOH — ECONOMIC STABILITY: TRANSPORTATION INSECURITY
IN THE PAST 12 MONTHS, HAS LACK OF RELIABLE TRANSPORTATION KEPT YOU FROM MEDICAL APPOINTMENTS, MEETINGS, WORK OR FROM GETTING THINGS NEEDED FOR DAILY LIVING?: NO

## 2025-02-26 SDOH — ECONOMIC STABILITY: HOUSING INSECURITY: DO YOU HAVE PROBLEMS WITH ANY OF THE FOLLOWING?: NONE OF THE ABOVE

## 2025-02-26 ASSESSMENT — SOCIAL DETERMINANTS OF HEALTH (SDOH): IN THE PAST 12 MONTHS, HAS THE ELECTRIC, GAS, OIL, OR WATER COMPANY THREATENED TO SHUT OFF SERVICE IN YOUR HOME?: NO

## 2025-02-27 ENCOUNTER — APPOINTMENT (OUTPATIENT)
Dept: OBGYN | Age: 40
End: 2025-02-27

## 2025-02-27 VITALS
WEIGHT: 184.25 LBS | TEMPERATURE: 98 F | HEIGHT: 63 IN | BODY MASS INDEX: 32.64 KG/M2 | SYSTOLIC BLOOD PRESSURE: 105 MMHG | OXYGEN SATURATION: 98 % | HEART RATE: 77 BPM | DIASTOLIC BLOOD PRESSURE: 71 MMHG

## 2025-02-27 DIAGNOSIS — N64.4 BREAST PAIN, RIGHT: ICD-10-CM

## 2025-02-27 DIAGNOSIS — Z01.419 ENCOUNTER FOR WELL WOMAN EXAM WITH ROUTINE GYNECOLOGICAL EXAM: Primary | ICD-10-CM

## 2025-02-27 PROCEDURE — 99395 PREV VISIT EST AGE 18-39: CPT | Performed by: OBSTETRICS & GYNECOLOGY

## 2025-02-27 ASSESSMENT — PATIENT HEALTH QUESTIONNAIRE - PHQ9
2. FEELING DOWN, DEPRESSED OR HOPELESS: NOT AT ALL
1. LITTLE INTEREST OR PLEASURE IN DOING THINGS: NOT AT ALL
CLINICAL INTERPRETATION OF PHQ2 SCORE: NO FURTHER SCREENING NEEDED
SUM OF ALL RESPONSES TO PHQ9 QUESTIONS 1 AND 2: 0
SUM OF ALL RESPONSES TO PHQ9 QUESTIONS 1 AND 2: 0

## 2025-08-20 ENCOUNTER — APPOINTMENT (OUTPATIENT)
Dept: OBGYN | Age: 40
End: 2025-08-20

## 2025-08-20 VITALS
BODY MASS INDEX: 32.5 KG/M2 | DIASTOLIC BLOOD PRESSURE: 75 MMHG | HEIGHT: 63 IN | WEIGHT: 183.42 LBS | OXYGEN SATURATION: 97 % | SYSTOLIC BLOOD PRESSURE: 117 MMHG | HEART RATE: 87 BPM

## 2025-08-20 DIAGNOSIS — N83.202 CYST OF LEFT OVARY: Primary | ICD-10-CM

## 2025-08-20 DIAGNOSIS — L29.2 VULVAR ITCHING: ICD-10-CM

## 2025-08-20 DIAGNOSIS — Z12.4 PAP SMEAR FOR CERVICAL CANCER SCREENING: ICD-10-CM

## 2025-08-20 DIAGNOSIS — L73.9 FOLLICULITIS: ICD-10-CM

## 2025-08-20 PROCEDURE — 87661 TRICHOMONAS VAGINALIS AMPLIF: CPT | Performed by: CLINICAL MEDICAL LABORATORY

## 2025-08-20 PROCEDURE — 87481 CANDIDA DNA AMP PROBE: CPT | Performed by: CLINICAL MEDICAL LABORATORY

## 2025-08-20 PROCEDURE — 81513 NFCT DS BV RNA VAG FLU ALG: CPT | Performed by: CLINICAL MEDICAL LABORATORY

## 2025-08-21 LAB
BACTERIAL VAGINOSIS VAG-IMP: NOT DETECTED
C ALBICANS DNA VAG QL NAA+PROBE: NOT DETECTED
C GLABRATA DNA VAG QL NAA+PROBE: NOT DETECTED
SERVICE CMNT-IMP: NORMAL
SERVICE CMNT-IMP: NORMAL
T VAGINALIS DNA VAG QL NAA+PROBE: NOT DETECTED

## 2025-08-22 ENCOUNTER — RESULTS FOLLOW-UP (OUTPATIENT)
Dept: OBGYN | Age: 40
End: 2025-08-22

## 2025-08-27 LAB
CASE RPRT: NORMAL
CYTOLOGY CVX/VAG STUDY: NORMAL
HPV16+18+45 E6+E7MRNA CVX NAA+PROBE: NEGATIVE
PAP EDUCATIONAL NOTE: NORMAL
SERVICE CMNT-IMP: NORMAL
STAT OF ADQ CVX/VAG CYTO-IMP: NORMAL

## 2025-10-09 ENCOUNTER — APPOINTMENT (OUTPATIENT)
Dept: OBGYN | Age: 40
End: 2025-10-09

## 2025-10-17 ENCOUNTER — APPOINTMENT (OUTPATIENT)
Dept: OBGYN | Age: 40
End: 2025-10-17
Attending: STUDENT IN AN ORGANIZED HEALTH CARE EDUCATION/TRAINING PROGRAM

## 2025-10-17 ENCOUNTER — APPOINTMENT (OUTPATIENT)
Dept: OBGYN | Age: 40
End: 2025-10-17

## (undated) DEVICE — Device

## (undated) DEVICE — LAWSON - GOWN SURG STD XL STL DISP

## (undated) NOTE — LETTER
Date: 2/8/2024    Patient Name: Stephanie Hanks          To Whom it may concern:    This letter has been written at the patient's request. The above patient was seen at the [facility] for treatment of a medical condition.    This patient may continue to work with the below restrictions:  Patient should be allowed to work from home.     We will reassess at the patient's follow up 3/14/24, Thursday. Please do not hesitate to contact our office with questions/concerns, thank you. [phone], option 2.         Sincerely,    June Bradford PA-C

## (undated) NOTE — LETTER
Date: 4/17/2024    Patient Name: Stephanie Hanks          To Whom it may concern:    This letter has been written at the patient's request. The above patient was seen at [facility] for treatment of a medical condition.    This patient may continue to work with the below restrictions:  Patient should be allowed to work from home.      We will reassess at the patient's follow up in approximately 4 weeks. Please do not hesitate to contact our office with questions/concerns, thank you. [phone], option 2.           Sincerely,    June Bradford PA-C

## (undated) NOTE — LETTER
WHERE IS YOUR PAIN NOW?  Ayan the areas on your body where you feel the described sensations.  Use the appropriate symbol.  Ayan the areas of radiation.  Include all affected areas.  Just to complete the picture, please draw in the face.     ACHE:  ^ ^ ^   NUMBNESS:  0000   PINS & NEEDLES:  = = = =                              ^ ^ ^                       0000              = = = =                                    ^ ^ ^                       0000            = = = =      BURNING:  XXXX   STABBING: ////                  XXXX                ////                         XXXX          ////     Please ayan the line below indicating your degree of pain right now  with 0 being no pain 10 being the worst pain possible.                                         0             1             2              3             4              5              6              7             8             9             10         Patient Signature:

## (undated) NOTE — LETTER
To Whom It May Concern:    Stephanie Hanks has been under our care regarding ongoing medical issues. Because of this, she has been required to restrict her physical activities.  She was admitted to the hospital 1/13/2024 and would not be able to return until cleared to do so by a physician.    Please feel free to contact us if there are any questions.      Sincerely,      HAILEY MYLES MD    [facility] HOSPITALIST  [address] [facility] [address]  [phone]        Document generated by:  HAILEY MYLES MD

## (undated) NOTE — ED AVS SNAPSHOT
Shannon Guzman   MRN: [id number]    Department:  [facility] Emergency Department   Date of Visit:  12/16/2019           Disclosure     Insurance plans vary and the physician(s) referred by the ER may not be covered by your plan.  Please contact CARE PHYSICIAN AT ONCE OR RETURN IMMEDIATELY TO THE EMERGENCY DEPARTMENT. If you have been prescribed any medication(s), please fill your prescription right away and begin taking the medication(s) as directed.   If you believe that any of the medications

## (undated) NOTE — LETTER
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 Please allow for standing breaks approximately every 20 minutes to avoid flare-ups in lumbosacral pain. Please contact me with any questions.     Radha Martínez, PT, DPT           21st Century Cures Act Notice to Patient: Medical documents like this are made available to patients in the interest of transparency. However, be advised this is a medical document and it is intended as [id number]-swzw communication between your medical providers. This medical document may contain abbreviations, assessments, medical data, and results or other terms that are unfamiliar. Medical documents are intended to carry relevant information, facts as evident, and the clinical opinion of the practitioner. As such, this medical document may be written in language that appears blunt or direct. You are encouraged to contact your medical provider and/or [facility] if you have any questions about this medical document.

## (undated) NOTE — Clinical Note
Dear June,  I had the opportunity to see your patient Stephanie Hanks recently. I appreciate your confidence in me to care for your patients. Please feel free call me with any questions at [phone] or contact me through Epic.  Sincerely, Raheem Lilly MD Board Certified, Physical Medicine and Rehabilitation Specializing in Sports Medicine, Spine Medicine and Electrodiagnostic Medicine [facility]

## (undated) NOTE — LETTER
Date: 2/5/2024    Patient Name: Stephanie Hanks          To Whom it may concern:      This letter has been written at the patient's request. The above patient was scheduled to be seen at the [facility] for treatment of a medical condition on 2/6/2024.    The patient has now been rescheduled to 2/8/2024, by the provider's office. Patient is requesting to change her personal time off (PTO) to reflect the new appointment that is now scheduled for, 2/8/2024.    Please do not hesitate to contact our office with any questions/concerns, thank you.  [phone], option 2.      Sincerely,    EDI [address]aston Dr[address]

## (undated) NOTE — LETTER
Date: 3/14/2024    Patient Name: Stephanie Hanks          To Whom it may concern:    This letter has been written at the patient's request. The above patient was seen at [facility] for treatment of a medical condition.    This patient may continue to work with the below restrictions:  Patient should be allowed to work from home.      We will reassess at the patient's follow up in 2-3 weeks. Please do not hesitate to contact our office with questions/concerns, thank you. [phone], option 2.           Sincerely,    June Bradford PA-C